# Patient Record
Sex: FEMALE | Race: WHITE | NOT HISPANIC OR LATINO | Employment: FULL TIME | URBAN - METROPOLITAN AREA
[De-identification: names, ages, dates, MRNs, and addresses within clinical notes are randomized per-mention and may not be internally consistent; named-entity substitution may affect disease eponyms.]

---

## 2018-08-24 LAB — POC BETA-HCG (QUAL): NEGATIVE

## 2020-01-15 ENCOUNTER — HISTORICAL (OUTPATIENT)
Dept: ADMINISTRATIVE | Facility: HOSPITAL | Age: 30
End: 2020-01-15

## 2020-01-18 LAB — FINAL CULTURE: NORMAL

## 2020-03-13 ENCOUNTER — HISTORICAL (OUTPATIENT)
Dept: ADMINISTRATIVE | Facility: HOSPITAL | Age: 30
End: 2020-03-13

## 2020-03-16 LAB — FINAL CULTURE: NORMAL

## 2020-08-06 ENCOUNTER — HISTORICAL (OUTPATIENT)
Dept: ADMINISTRATIVE | Facility: HOSPITAL | Age: 30
End: 2020-08-06

## 2020-08-08 LAB — FINAL CULTURE: NORMAL

## 2020-08-13 ENCOUNTER — HISTORICAL (OUTPATIENT)
Dept: RADIOLOGY | Facility: HOSPITAL | Age: 30
End: 2020-08-13

## 2022-04-10 ENCOUNTER — HISTORICAL (OUTPATIENT)
Dept: ADMINISTRATIVE | Facility: HOSPITAL | Age: 32
End: 2022-04-10

## 2022-04-24 VITALS
OXYGEN SATURATION: 100 % | HEIGHT: 60 IN | BODY MASS INDEX: 21.2 KG/M2 | SYSTOLIC BLOOD PRESSURE: 120 MMHG | DIASTOLIC BLOOD PRESSURE: 83 MMHG | WEIGHT: 108 LBS

## 2022-05-03 NOTE — HISTORICAL [facility]
This is a historical note converted from Ana. Formatting and pictures may have been removed.  Please reference [employer] for original formatting and attached multimedia. Chief Complaint  cough wheezing and SOB at times, chest congestion x 2-3 weeks, (2 negative covid tests last one around 6/23) No exposure to covid known  History of Present Illness  Pt?30 Years?White?Female?seen and evaluated in a limited status for concern for COVID-19. In order to decrease the risk of exposure to the hospital and health care workers, only a limited exam was done.  Today pt presents to clinic with cough, wheezing, SOB at times, chest congestion for 2-3 weeks.? She has had 2 negative COVID tests and last one was around 6/23. She has had no known COVID exposure. Denies hx of asthma. non-smoker. Seen in ED 7/5 and dx with acute pharyngitis. Neb tx TID. Denies HA, nausea or vomiting, denies body aches.? Cough is dry and raspy. Tx in March with same symptoms given Doxy and steroid injection.? Was on inhaler and uses Claritin daily  ?   Risk Factors Include  none  ?   Provider Precautions  N95 Mask,  Standard Isolation Gown,  Gloves,  Eye protection- Goggles  ?  Covid Screening?  No confirmed close contact  No travel to high risk area  No Recent Testing Done  Review of Systems  General: negative except as stated in hpi  Skin: negative except as stated in hpi  HEENT: negative except as stated in hpi  Respiratory: negative except as stated in hpi  CV: negative except as stated in hpi  GI: negative except as stated in hpi  : negative except as stated in hpi  MS: negative except as stated in hpi  Neuro: negative except as stated in hpi  Hematologic: negative except as stated in hpi  Endocrine: negative except as stated in hpi  Psych: negative except as stated in hpi  Physical Exam  Vitals & Measurements  T:?37.2? ?C (Oral)? HR:?91(Peripheral)? RR:?20? BP:?109/76? SpO2:?99%?  HT:?152.00?cm? HT:?152?cm? WT:?50.000?kg? WT:?50?kg? BMI:?21.64?  LMP:?07/06/2020 00:00 CDT?  General: Well-developed, well-nourished, conscious and coherent, in NAD  VS: reviewed  Skin: w/d without rash, good texture and turgor  HEENT:  ???? Head: NC/AT  ???? Eyes: Sclera and conjunctiva normal, PERRLA, EOMI  ???? Ears: canals patent without erythema/edema; TMs pearly gray without erythema/edema/drainage/bulging/retraction  ???? Nose/Face: Without rhinorrhea; turbinates boggy with erythema/edema  ???? Mouth/Throat: MMM, posterior pharynx clear without erythema or exudates  Neck: supple without meningismus or adenopathy. Carotids are equal. Trachea midline. No bruits or JVD.  Chest: normal AP diameter. Good expansion without retractions. Nontender. Lungs CTAB  Heart: RRR. Tones normal. No m/r/g. No peripheral edema. Pulses 2+  Abdomen: soft, nontender without masses, guarding, or rebound.? BS active.? No hepatosplenomegaly.  Back: without spinal or CVAT  Extremities: FROM. Good strength, bilaterally. No clubbing, cyanosis. or edema.? Peripheral pulses intact and sensation intact.  Neuro:? AAOx4. GCS 15. Cranial nerves II-XII intact. Motor and sensory exam nonfocal. REID. Speech clear. Normal gait  Assessment/Plan  1.?Frontal sinusitis?J32.1  Stop taking?Mucinex  Augmentin 875/125mg po BID x 10 days  Medrol dose pack  Flonase nasal spray daily  Continue claritin daily and inhalers  FU with PCP if no improvement in 2-3 days.  Ordered:  amoxicillin-clavulanate, = 1 tab(s), Oral, q12hr, X 10 day(s), # 20 tab(s), 0 Refill(s), Pharmacy: [facility] #[id number], 152, cm, Height/Length Dosing, 08/06/20 12:30:00 CDT, 50, kg, Weight Dosing, 08/06/20 12:30:00 CDT  fluticasone nasal, 1 spray(s), Nasal, BID, # 1 bottle(s), 0 Refill(s), Pharmacy: [facility] #[id number], 152, cm, Height/Length Dosing, 08/06/20 12:30:00 CDT, 50, kg, Weight Dosing, 08/06/20 12:30:00 CDT  methylPREDNISolone, = 1 packet(s), Oral, As Directed, as directed on package labeling, X 6 day(s), # 21 tab(s), 0  Refill(s), Pharmacy: [facility] #[id number], 152, cm, Height/Length Dosing, 08/06/20 12:30:00 CDT, 50, kg, Weight Dosing, 08/06/20 12:30:00 CDT  Office/Outpatient Visit Level 4 Established [id number] PC, Frontal sinusitis  Sore throat, 08/06/20 13:45:00 CDT  ?  2.?Sore throat?J02.9,?Sore throat?J02.9  Strep negative, culture pending  warm salt water gargles  Ordered:  Office/Outpatient Visit Level 4 Established [id number] PC, Frontal sinusitis  Sore throat, 08/06/20 13:45:00 CDT  ?   Problem List/Past Medical History  Ongoing  Anxiety  GERD (gastroesophageal reflux disease)  Ovarian cyst  Tobacco user  Historical  No qualifying data  Procedure/Surgical History  tubes placed in ears during infancy   Medications  albuterol CFC free 90 mcg/inh inhalation aerosol with adapter, 1 puff(s), INH, QID, PRN  atropine-diphenoxylate 0.025 mg-2.5 mg oral tablet, 2 tab(s), Oral, QID,? ?Not Taking, Completed Rx  Augmentin 875 mg-125 mg oral tablet, 1 tab(s), Oral, q12hr  Claritin Reditab 10 mg oral tablet, disintegrating, 10 mg= 1 tab(s), Oral, Daily  Flonase 50 mcg/inh nasal spray, 1 spray(s), Nasal, BID  hyoscyamine 0.125 mg sublingual tablet, 0.125 mg= 1 tab(s), SL, q4hr,? ?Not Taking per Prescriber  Medrol Dosepak 4 mg oral tablet, 1 packet(s), Oral, As Directed  Vistaril 25 mg oral capsule, 25 mg= 1 cap(s), Oral, QID, PRN  Allergies  Pediazole  Social History  Abuse/Neglect  No, 08/06/2020  Alcohol - Low Risk, 12/02/2014  Current, 1-2 times per week, 03/13/2020  Employment/School  Employed, Work/School description: ., 08/24/2018  Exercise  Exercise frequency: 3-4 times/week. Exercise type: bike., 08/24/2018  Home/Environment  Lives with roommate. Living situation: Home/Independent., 08/24/2018  Nutrition/Health  Regular, 08/24/2018  Sexual  Sexually active: Yes. Number of current partners 1. Sexual orientation: Bisexual., 08/24/2018  Substance Use - Denies Substance Abuse, 08/05/2014  Current, Marijuana, 1-2 times per  year, 07/05/2020  Tobacco - Denies Tobacco Use, 09/24/2014  Never (less than 100 in lifetime), No, 08/06/2020  Family History  Asthma.: Mother.  Diabetes mellitus type 2: Grandmother.  Skin cancer: Grandmother.  Immunizations  Vaccine Date Status   influenza virus vaccine, inactivated 01/14/2014 Recorded   influenza virus vaccine, inactivated 12/14/2012 Recorded   Health Maintenance  Health Maintenance  ???Pending?(in the next year)  ??? ??Due?  ??? ? ? ?Influenza Vaccine due??08/06/20??and every?  ??? ? ? ?Tetanus Vaccine due??08/06/20??and every 10??year(s)  ??? ??Refused?  ??? ? ? ?Smoking Cessation due??01/01/20??and every 1??year(s)  ??? ??Due In Future?  ??? ? ? ?Obesity Screening not due until??01/01/21??and every 1??year(s)  ??? ? ? ?Alcohol Misuse Screening not due until??01/02/21??and every 1??year(s)  ??? ? ? ?Depression Screening not due until??03/13/21??and every 1??year(s)  ??? ? ? ?ADL Screening not due until??03/13/21??and every 1??year(s)  ???Satisfied?(in the past 1 year)  ??? ??Satisfied?  ??? ? ? ?ADL Screening on??03/13/20.??Satisfied by Paul Cueto  ??? ? ? ?Alcohol Misuse Screening on??03/13/20.??Satisfied by Paul Cueto  ??? ? ? ?Blood Pressure Screening on??08/06/20.??Satisfied by Gifty Parker LPN  ??? ? ? ?Body Mass Index Check on??08/06/20.??Satisfied by Gifty Parker LPN  ??? ? ? ?Depression Screening on??03/13/20.??Satisfied by Paul Cueto  ??? ? ? ?Influenza Vaccine on??01/15/20.??Satisfied by Gifty Parker LPN  ??? ? ? ?Obesity Screening on??08/06/20.??Satisfied by Gifty Parker LPN  ??? ??Refused?  ??? ? ? ?Smoking Cessation on??03/13/20.??Recorded by Paul Cueto??Reason: Patient Refuses  ?  Lab Results  Test Name Test Result Date/Time   Rapid Strep POC Negative 08/06/2020 12:42 CDT   Diagnostic Results  (08/06/2020 13:22 CDT XR Chest 2 Views)  * Final Report *  ?  Reason For Exam  cough x 3 weeks;Cough  ?  Radiology Report  ?  History:  Cough  ?  Reference:  11 May  2016  ?  Findings:  PA and lateral views of the chest were obtained. Heart and mediastinum  within normal limits. The lungs are clear. No pneumothorax or  significant effusion.  ?  Impression:?  No acute cardiopulmonary abnormality. [1]     [1]?XR Chest 2 Views; Nimisha GARCIA, Zhen Brandon 08/06/2020 13:22 CDT

## 2022-05-19 ENCOUNTER — OFFICE VISIT (OUTPATIENT)
Dept: URGENT CARE | Facility: CLINIC | Age: 32
End: 2022-05-19
Payer: MEDICAID

## 2022-05-19 VITALS
HEIGHT: 61 IN | HEART RATE: 65 BPM | SYSTOLIC BLOOD PRESSURE: 101 MMHG | RESPIRATION RATE: 20 BRPM | TEMPERATURE: 99 F | WEIGHT: 126.13 LBS | OXYGEN SATURATION: 97 % | BODY MASS INDEX: 23.81 KG/M2 | DIASTOLIC BLOOD PRESSURE: 67 MMHG

## 2022-05-19 DIAGNOSIS — Z87.09 HISTORY OF ASTHMA: Primary | ICD-10-CM

## 2022-05-19 PROCEDURE — 3078F DIAST BP <80 MM HG: CPT | Mod: CPTII,,, | Performed by: NURSE PRACTITIONER

## 2022-05-19 PROCEDURE — 3008F BODY MASS INDEX DOCD: CPT | Mod: CPTII,,, | Performed by: NURSE PRACTITIONER

## 2022-05-19 PROCEDURE — 1160F PR REVIEW ALL MEDS BY PRESCRIBER/CLIN PHARMACIST DOCUMENTED: ICD-10-PCS | Mod: CPTII,,, | Performed by: NURSE PRACTITIONER

## 2022-05-19 PROCEDURE — 3074F SYST BP LT 130 MM HG: CPT | Mod: CPTII,,, | Performed by: NURSE PRACTITIONER

## 2022-05-19 PROCEDURE — 3078F PR MOST RECENT DIASTOLIC BLOOD PRESSURE < 80 MM HG: ICD-10-PCS | Mod: CPTII,,, | Performed by: NURSE PRACTITIONER

## 2022-05-19 PROCEDURE — 3074F PR MOST RECENT SYSTOLIC BLOOD PRESSURE < 130 MM HG: ICD-10-PCS | Mod: CPTII,,, | Performed by: NURSE PRACTITIONER

## 2022-05-19 PROCEDURE — 1160F RVW MEDS BY RX/DR IN RCRD: CPT | Mod: CPTII,,, | Performed by: NURSE PRACTITIONER

## 2022-05-19 PROCEDURE — 99214 OFFICE O/P EST MOD 30 MIN: CPT | Mod: PBBFAC | Performed by: NURSE PRACTITIONER

## 2022-05-19 PROCEDURE — 1159F PR MEDICATION LIST DOCUMENTED IN MEDICAL RECORD: ICD-10-PCS | Mod: CPTII,,, | Performed by: NURSE PRACTITIONER

## 2022-05-19 PROCEDURE — 3008F PR BODY MASS INDEX (BMI) DOCUMENTED: ICD-10-PCS | Mod: CPTII,,, | Performed by: NURSE PRACTITIONER

## 2022-05-19 PROCEDURE — 99203 OFFICE O/P NEW LOW 30 MIN: CPT | Mod: S$PBB,,, | Performed by: NURSE PRACTITIONER

## 2022-05-19 PROCEDURE — 99203 PR OFFICE/OUTPT VISIT, NEW, LEVL III, 30-44 MIN: ICD-10-PCS | Mod: S$PBB,,, | Performed by: NURSE PRACTITIONER

## 2022-05-19 PROCEDURE — 1159F MED LIST DOCD IN RCRD: CPT | Mod: CPTII,,, | Performed by: NURSE PRACTITIONER

## 2022-05-19 RX ORDER — PAROXETINE 10 MG/1
10 TABLET, FILM COATED ORAL DAILY
COMMUNITY
Start: 2022-05-14 | End: 2023-11-15 | Stop reason: ALTCHOICE

## 2022-05-19 RX ORDER — CETIRIZINE HYDROCHLORIDE 10 MG/1
10 TABLET ORAL NIGHTLY
Qty: 30 TABLET | Refills: 0 | COMMUNITY
Start: 2022-05-19 | End: 2022-06-18

## 2022-05-19 RX ORDER — ALBUTEROL SULFATE 90 UG/1
2 AEROSOL, METERED RESPIRATORY (INHALATION) EVERY 6 HOURS PRN
Qty: 18 G | Refills: 0 | Status: SHIPPED | OUTPATIENT
Start: 2022-05-19 | End: 2023-12-26 | Stop reason: ALTCHOICE

## 2022-05-20 ENCOUNTER — OFFICE VISIT (OUTPATIENT)
Dept: URGENT CARE | Facility: CLINIC | Age: 32
End: 2022-05-20
Payer: MEDICAID

## 2022-05-20 ENCOUNTER — HOSPITAL ENCOUNTER (OUTPATIENT)
Dept: RADIOLOGY | Facility: HOSPITAL | Age: 32
Discharge: HOME | End: 2022-05-20
Attending: FAMILY MEDICINE
Payer: MEDICAID

## 2022-05-20 VITALS
DIASTOLIC BLOOD PRESSURE: 67 MMHG | BODY MASS INDEX: 23.93 KG/M2 | OXYGEN SATURATION: 99 % | HEART RATE: 58 BPM | TEMPERATURE: 98 F | SYSTOLIC BLOOD PRESSURE: 112 MMHG | WEIGHT: 126.75 LBS | HEIGHT: 61 IN | RESPIRATION RATE: 16 BRPM

## 2022-05-20 DIAGNOSIS — T14.90XA TRAUMA: Primary | ICD-10-CM

## 2022-05-20 DIAGNOSIS — S93.401A SPRAIN OF RIGHT ANKLE, UNSPECIFIED LIGAMENT, INITIAL ENCOUNTER: ICD-10-CM

## 2022-05-20 PROCEDURE — 3074F PR MOST RECENT SYSTOLIC BLOOD PRESSURE < 130 MM HG: ICD-10-PCS | Mod: CPTII,,, | Performed by: FAMILY MEDICINE

## 2022-05-20 PROCEDURE — 3078F DIAST BP <80 MM HG: CPT | Mod: CPTII,,, | Performed by: FAMILY MEDICINE

## 2022-05-20 PROCEDURE — 99214 OFFICE O/P EST MOD 30 MIN: CPT | Mod: S$PBB,,, | Performed by: FAMILY MEDICINE

## 2022-05-20 PROCEDURE — 73610 X-RAY EXAM OF ANKLE: CPT | Mod: TC,RT

## 2022-05-20 PROCEDURE — 3008F PR BODY MASS INDEX (BMI) DOCUMENTED: ICD-10-PCS | Mod: CPTII,,, | Performed by: FAMILY MEDICINE

## 2022-05-20 PROCEDURE — 3074F SYST BP LT 130 MM HG: CPT | Mod: CPTII,,, | Performed by: FAMILY MEDICINE

## 2022-05-20 PROCEDURE — 99214 PR OFFICE/OUTPT VISIT, EST, LEVL IV, 30-39 MIN: ICD-10-PCS | Mod: S$PBB,,, | Performed by: FAMILY MEDICINE

## 2022-05-20 PROCEDURE — 1160F PR REVIEW ALL MEDS BY PRESCRIBER/CLIN PHARMACIST DOCUMENTED: ICD-10-PCS | Mod: CPTII,,, | Performed by: FAMILY MEDICINE

## 2022-05-20 PROCEDURE — 1159F MED LIST DOCD IN RCRD: CPT | Mod: CPTII,,, | Performed by: FAMILY MEDICINE

## 2022-05-20 PROCEDURE — 3008F BODY MASS INDEX DOCD: CPT | Mod: CPTII,,, | Performed by: FAMILY MEDICINE

## 2022-05-20 PROCEDURE — 99215 OFFICE O/P EST HI 40 MIN: CPT | Mod: PBBFAC | Performed by: FAMILY MEDICINE

## 2022-05-20 PROCEDURE — 73610 XR ANKLE COMPLETE 3 VIEW RIGHT: ICD-10-PCS | Mod: 26,RT,, | Performed by: RADIOLOGY

## 2022-05-20 PROCEDURE — 1160F RVW MEDS BY RX/DR IN RCRD: CPT | Mod: CPTII,,, | Performed by: FAMILY MEDICINE

## 2022-05-20 PROCEDURE — 1159F PR MEDICATION LIST DOCUMENTED IN MEDICAL RECORD: ICD-10-PCS | Mod: CPTII,,, | Performed by: FAMILY MEDICINE

## 2022-05-20 PROCEDURE — 3078F PR MOST RECENT DIASTOLIC BLOOD PRESSURE < 80 MM HG: ICD-10-PCS | Mod: CPTII,,, | Performed by: FAMILY MEDICINE

## 2022-05-20 PROCEDURE — 73610 X-RAY EXAM OF ANKLE: CPT | Mod: 26,RT,, | Performed by: RADIOLOGY

## 2022-05-20 RX ORDER — LORATADINE 10 MG/1
TABLET ORAL
COMMUNITY

## 2022-05-20 RX ORDER — FLUTICASONE PROPIONATE 50 MCG
SPRAY, SUSPENSION (ML) NASAL
COMMUNITY

## 2022-05-20 NOTE — PROGRESS NOTES
"Subjective:       Patient ID: Aliya Alcaraz is a 32 y.o. female.    Vitals:  height is 5' 1.02" (1.55 m) and weight is 57.5 kg (126 lb 12.2 oz). Her temperature is 98.4 °F (36.9 °C). Her blood pressure is 112/67 and her pulse is 58 (abnormal). Her respiration is 16 and oxygen saturation is 99%.     Chief Complaint: Rt ankle pain (Pt states she "rolled ankle" on an incline yesterday.)    HPI   32-year-old female presents to urgent care after an inversion injury to the right ankle yesterday.  Having lateral ankle pain, no foot pain, no leg or knee pain.  No other injury  ROS    Constitutional: negative except as stated in HPI  Eye: negative except as stated in HPI  ENT: negative except as stated in HPI  Respiratory: negative except as stated in HPI  Cardiovascular: negative except as stated in HPI  Gastrointestinal: negative except as stated in HPI  Genitourinary: negative except as stated in HPI  Objective:      Physical Exam    VITAL SIGNS:  Reviewed.      GENERAL:  In no apparent distress  HEAD:  No signs of head trauma    MUSCULOSKELETAL:  Right Foot  Inspection:  Lateral ankle effusion, no erythema or heat.  Palpation:  Mild tenderness over lateral malleolus, o/w negative  ROM: full  Strength: 5/5 in all planes  Special Tests:  Anterior Drawer: negative  Talar Tilt: negative  Interdigital Neuroma Test: negative  NEUROLOGIC EXAM:  Alert and oriented x 3.  No focal sensory or strength deficits.   Speech normal.  Follows commands    XR ANKLE COMPLETE 3 VIEW RIGHT    Result Date: 5/20/2022  EXAMINATION: XR ANKLE COMPLETE 3 VIEW RIGHT CLINICAL HISTORY: Injury, unspecified, initial encounter TECHNIQUE: Three views COMPARISON: None available FINDINGS: There are soft tissue inflammations overlying the lateral malleolus.  Articular surfaces alignment is preserved.  No acute fracture or dislocation identified.     No acute fracture identified. Electronically signed by: Ti Garcia Date:    05/20/2022 " Time:    15:29    Assessment:       1. Trauma    2. Sprain of right ankle, unspecified ligament, initial encounter          Plan:         Trauma  -     XR ANKLE COMPLETE 3 VIEW RIGHT; Future; Expected date: 05/20/2022    Sprain of right ankle, unspecified ligament, initial encounter                 Discussed negative x-ray findings.  Given gel splint, discussed range-of-motion exercises.  Rest, ice, compression, and elevation.

## 2022-05-20 NOTE — PROGRESS NOTES
"Subjective:      Patient ID: Aliya Alcaraz is a 32 y.o. female.    Chief Complaint: Chest Pain (Tightness after covid 2 weeks ago. Patient states she has asthma, when she get cold her asthma bothers her more. ), Nasal Congestion (X 2 weeks), and Cough     32-year-old female presents to the clinic reporting of wheezing and a cough specially at night. Patient state 2 weeks ago she tested positive for COVID-19 and she was seen at [facility] and she was prescribed Z-Marquez, Medrol Dosepak, and a cough syrup with codeine. Patient state the clinic had told her not to take any of these medication since she tested positive for COVID-19. Patient state to home COVID testing in the past few days that were negative. Patient denies any fever or nausea or vomiting or diarrhea. Patient denies chest pain or short of breath or any other symptoms. Patient wants to make sure her lungs were okay. Patient requesting refill on her albuterol inhaler. History of asthma.    Review of Systems   Respiratory: Positive for cough and wheezing.    All other systems reviewed and are negative.      /67   Pulse 65   Temp 98.6 °F (37 °C)   Resp 20   Ht 5' 1" (1.549 m)   Wt 57.2 kg (126 lb 1.6 oz)   LMP 04/25/2022 (LMP Unknown)   SpO2 97%   BMI 23.83 kg/m²      Current Outpatient Medications:     albuterol (PROVENTIL/VENTOLIN HFA) 90 mcg/actuation inhaler, Inhale 2 puffs into the lungs every 6 (six) hours as needed for Wheezing or Shortness of Breath (Cough)., Disp: 18 g, Rfl: 0    cetirizine (ZYRTEC) 10 MG tablet, Take 1 tablet (10 mg total) by mouth nightly., Disp: 30 tablet, Rfl: 0    paroxetine (PAXIL) 10 MG tablet, Take 10 mg by mouth once daily., Disp: , Rfl:     Objective:     Physical Exam  Vitals and nursing note reviewed.   Constitutional:       Appearance: Normal appearance.   HENT:      Head: Normocephalic and atraumatic.      Right Ear: Tympanic membrane, ear canal and external ear normal.      Left Ear: Tympanic " membrane, ear canal and external ear normal.      Nose: Nose normal.      Mouth/Throat:      Mouth: Mucous membranes are moist.      Pharynx: Oropharynx is clear.      Comments: Clear postnasal drip, uvula midline  Eyes:      Extraocular Movements: Extraocular movements intact.      Conjunctiva/sclera: Conjunctivae normal.      Pupils: Pupils are equal, round, and reactive to light.   Cardiovascular:      Rate and Rhythm: Normal rate and regular rhythm.      Pulses: Normal pulses.      Heart sounds: Normal heart sounds. No murmur heard.  Pulmonary:      Effort: Pulmonary effort is normal.      Breath sounds: Normal breath sounds. No wheezing, rhonchi or rales.   Chest:      Chest wall: No tenderness.   Musculoskeletal:         General: Normal range of motion.      Cervical back: Normal range of motion and neck supple. No tenderness.   Lymphadenopathy:      Cervical: No cervical adenopathy.   Skin:     General: Skin is warm.      Capillary Refill: Capillary refill takes less than 2 seconds.      Findings: No rash.   Neurological:      General: No focal deficit present.      Mental Status: She is alert and oriented to person, place, and time. Mental status is at baseline.   Psychiatric:         Mood and Affect: Mood normal.         Behavior: Behavior normal.         Thought Content: Thought content normal.         Judgment: Judgment normal.       Assessment:     Problem List Items Addressed This Visit    None     Visit Diagnoses     History of asthma    -  Primary    Relevant Medications    cetirizine (ZYRTEC) 10 MG tablet    albuterol (PROVENTIL/VENTOLIN HFA) 90 mcg/actuation inhaler          Plan:   Discussed physical exam findings with patient  Discussed medication  prescribed with patient, Rx albuterol inhaler, cetirizine 10 mg, stay hydrated with fluids specially water, continue social distancing, mask wearing, good hygiene follow CDC  recommendation regarding COVID-19 and flu  Instructed patient to notify his/  her primary care provider regarding the visit today and schedule a follow-up appointment in 2-3 days if needed   instructed patient to come back to the clinic or go to nearest emergency room department if symptoms worsens or no improvement or for any other reason   questions elicited and answered  Patient verbalized understanding of discharge instructions, verbalizes understanding of medication prescribed any issues, verbalizes understanding to read discharge instructions    History of asthma  -     cetirizine (ZYRTEC) 10 MG tablet; Take 1 tablet (10 mg total) by mouth nightly.  Dispense: 30 tablet; Refill: 0  -     albuterol (PROVENTIL/VENTOLIN HFA) 90 mcg/actuation inhaler; Inhale 2 puffs into the lungs every 6 (six) hours as needed for Wheezing or Shortness of Breath (Cough).  Dispense: 18 g; Refill: 0         Recent Lab Results     None                This note was created with the assistance of a voice recognition software or  phone dictation. There may be transcription errors as a result of using this technology, however minimal effort has been made to assure accuracy of transcription, but any obvious errors or omissions should be clarified with the author of the document.

## 2022-07-26 ENCOUNTER — OFFICE VISIT (OUTPATIENT)
Dept: URGENT CARE | Facility: CLINIC | Age: 32
End: 2022-07-26
Payer: MEDICAID

## 2022-07-26 ENCOUNTER — HOSPITAL ENCOUNTER (EMERGENCY)
Facility: HOSPITAL | Age: 32
Discharge: HOME | End: 2022-07-27
Attending: STUDENT IN AN ORGANIZED HEALTH CARE EDUCATION/TRAINING PROGRAM
Payer: MEDICAID

## 2022-07-26 VITALS
SYSTOLIC BLOOD PRESSURE: 114 MMHG | HEART RATE: 105 BPM | RESPIRATION RATE: 18 BRPM | DIASTOLIC BLOOD PRESSURE: 71 MMHG | BODY MASS INDEX: 23.6 KG/M2 | TEMPERATURE: 99 F | HEIGHT: 61 IN | OXYGEN SATURATION: 100 % | WEIGHT: 125 LBS

## 2022-07-26 DIAGNOSIS — R11.0 NAUSEA: ICD-10-CM

## 2022-07-26 DIAGNOSIS — R82.90 ABNORMAL URINE: ICD-10-CM

## 2022-07-26 DIAGNOSIS — R10.9 ABDOMINAL PAIN, UNSPECIFIED ABDOMINAL LOCATION: Primary | ICD-10-CM

## 2022-07-26 DIAGNOSIS — K52.9 GASTROENTERITIS: Primary | ICD-10-CM

## 2022-07-26 DIAGNOSIS — R19.7 DIARRHEA, UNSPECIFIED TYPE: ICD-10-CM

## 2022-07-26 DIAGNOSIS — Z11.52 ENCOUNTER FOR SCREENING FOR SEVERE ACUTE RESPIRATORY SYNDROME CORONAVIRUS 2 (SARS-COV-2) INFECTION: ICD-10-CM

## 2022-07-26 LAB
APPEARANCE UR: CLEAR
B-HCG UR QL: NEGATIVE
BACTERIA #/AREA URNS AUTO: ABNORMAL /HPF
BILIRUB UR QL STRIP.AUTO: NEGATIVE MG/DL
BILIRUB UR QL STRIP: POSITIVE
COLOR UR AUTO: YELLOW
CTP QC/QA: YES
FLUAV AG NPH QL: NEGATIVE
FLUBV AG NPH QL: NEGATIVE
GLUCOSE UR QL STRIP.AUTO: NORMAL MG/DL
GLUCOSE UR QL STRIP: NEGATIVE
HYALINE CASTS #/AREA URNS LPF: ABNORMAL /LPF
KETONES UR QL STRIP.AUTO: ABNORMAL MG/DL
KETONES UR QL STRIP: POSITIVE
LEUKOCYTE ESTERASE UR QL STRIP.AUTO: NEGATIVE UNIT/L
LEUKOCYTE ESTERASE UR QL STRIP: NEGATIVE
MUCOUS THREADS URNS QL MICRO: ABNORMAL /LPF
NITRITE UR QL STRIP.AUTO: NEGATIVE
PH UR STRIP.AUTO: 8 [PH]
PH, POC UA: 8
POC BLOOD, URINE: NEGATIVE
POC NITRATES, URINE: NEGATIVE
PROT UR QL STRIP.AUTO: ABNORMAL MG/DL
PROT UR QL STRIP: POSITIVE
RBC #/AREA URNS AUTO: ABNORMAL /HPF
RBC UR QL AUTO: NEGATIVE UNIT/L
SARS-COV-2 RDRP RESP QL NAA+PROBE: NEGATIVE
SP GR UR STRIP.AUTO: 1.04
SP GR UR STRIP: 1.02 (ref 1–1.03)
SQUAMOUS #/AREA URNS LPF: ABNORMAL /HPF
UROBILINOGEN UR STRIP-ACNC: 1 (ref 0.1–1.1)
UROBILINOGEN UR STRIP-ACNC: ABNORMAL MG/DL
WBC #/AREA URNS AUTO: ABNORMAL /HPF

## 2022-07-26 PROCEDURE — 87804 INFLUENZA ASSAY W/OPTIC: CPT | Mod: PBBFAC

## 2022-07-26 PROCEDURE — 99214 OFFICE O/P EST MOD 30 MIN: CPT | Mod: PBBFAC

## 2022-07-26 PROCEDURE — 81025 URINE PREGNANCY TEST: CPT | Mod: PBBFAC

## 2022-07-26 PROCEDURE — 83690 ASSAY OF LIPASE: CPT | Performed by: PHYSICIAN ASSISTANT

## 2022-07-26 PROCEDURE — 81003 URINALYSIS AUTO W/O SCOPE: CPT | Mod: PBBFAC

## 2022-07-26 PROCEDURE — 99213 OFFICE O/P EST LOW 20 MIN: CPT | Mod: S$PBB,,,

## 2022-07-26 PROCEDURE — U0002 COVID-19 LAB TEST NON-CDC: HCPCS | Mod: PBBFAC

## 2022-07-26 PROCEDURE — 81025 URINE PREGNANCY TEST: CPT | Performed by: PHYSICIAN ASSISTANT

## 2022-07-26 PROCEDURE — 80053 COMPREHEN METABOLIC PANEL: CPT | Performed by: PHYSICIAN ASSISTANT

## 2022-07-26 PROCEDURE — 36415 COLL VENOUS BLD VENIPUNCTURE: CPT | Performed by: PHYSICIAN ASSISTANT

## 2022-07-26 PROCEDURE — 99213 PR OFFICE/OUTPT VISIT, EST, LEVL III, 20-29 MIN: ICD-10-PCS | Mod: S$PBB,,,

## 2022-07-26 PROCEDURE — 85025 COMPLETE CBC W/AUTO DIFF WBC: CPT | Performed by: PHYSICIAN ASSISTANT

## 2022-07-26 PROCEDURE — 99284 EMERGENCY DEPT VISIT MOD MDM: CPT | Mod: 25,27

## 2022-07-26 PROCEDURE — 81001 URINALYSIS AUTO W/SCOPE: CPT

## 2022-07-26 RX ORDER — ONDANSETRON 4 MG/1
4 TABLET, ORALLY DISINTEGRATING ORAL EVERY 8 HOURS PRN
Qty: 10 TABLET | Refills: 0 | Status: SHIPPED | OUTPATIENT
Start: 2022-07-26 | End: 2023-12-26 | Stop reason: ALTCHOICE

## 2022-07-26 NOTE — LETTER
July 26, 2022      [facility]  [address] [address]  Phone: [phone]       Patient: Aliya Alcaraz   YOB: 1990  Date of Visit: 07/26/2022    To Whom It May Concern:    Mikey Alcaraz  was at [facility] on 07/26/2022. The patient may return to work/school on 7/30/2022 with no restrictions. If you have any questions or concerns, or if I can be of further assistance, please do not hesitate to contact me.    Sincerely,    MARY CARMEN HANDY NP

## 2022-07-26 NOTE — PROGRESS NOTES
"Subjective:       Patient ID: Aliya Alcaraz is a 32 y.o. female.    Vitals:  height is 5' 1" (1.549 m) and weight is 56.7 kg (125 lb). Her oral temperature is 98.9 °F (37.2 °C). Her blood pressure is 114/71 and her pulse is 105. Her respiration is 18 and oxygen saturation is 100%.     Chief Complaint: Abdominal Pain, Nausea, Diarrhea (Abdominal pain noted for about one week ago, nausea and vomiting today. ), and Weakness    32 year old with abdominal pain x one week, started with N/V/D today. States has only been able to sip fluids today. Denies fever. States generalized abdominal pain. No guarding or rebound tenderness.  [place] if she ingested any unsanitary lake water. Denies dysuria or frequency.   COVID/FLU negative    Abdominal Pain  This is a new problem. The current episode started in the past 7 days. The onset quality is gradual. The pain is located in the generalized abdominal region. The pain is at a severity of 4/10. The pain is mild. The quality of the pain is aching. The abdominal pain does not radiate. Associated symptoms include diarrhea and nausea.   Nausea  Associated symptoms include abdominal pain and nausea.   Diarrhea   Associated symptoms include abdominal pain.       Constitution: Positive for generalized weakness.   HENT: Negative.    Neck: neck negative.   Cardiovascular: Negative.    Respiratory: Negative.    Gastrointestinal: Positive for abdominal pain, nausea and diarrhea.       Objective:      Physical Exam   Constitutional: normal  HENT:   Head: Normocephalic.   Nose: Nose normal.   Mouth/Throat: Mucous membranes are moist.   Eyes: Pupils are equal, round, and reactive to light.   Cardiovascular: Normal rate, regular rhythm and normal pulses.   Pulmonary/Chest: Effort normal.   Abdominal: Normal appearance and bowel sounds are normal. She exhibits no distension and no mass. Soft. There is generalized abdominal tenderness. There is no rebound, no guarding, " no left CVA tenderness and no right CVA tenderness. No hernia.   Musculoskeletal: Normal range of motion.         General: Normal range of motion.   Neurological: no focal deficit. She is alert.   Skin: Skin is warm.   Psychiatric: Mood normal.   Vitals reviewed.        Assessment:       1. Abdominal pain, unspecified abdominal location    2. Encounter for screening for severe acute respiratory syndrome coronavirus 2 (SARS-CoV-2) infection    3. Diarrhea, unspecified type    4. Abnormal urine    5. Nausea          Plan:         UA sent for urinalysis. COVID FLU negative.  Ondansetron for nausea.   See patient education for more guidance.    Prescription meds were sent for you - wait 8 hours from the time you left our clinic to take another dose of Ondansetron.     Sanitize your bathroom and all shared surfaces with BLEACH as frequently as you can while ill.     Start back with WATER only. When you can keep water down for 2 hours, you may try powerade, gatorade, pedialyte or electrolyte beverage of choice.    Do not attempt to eat until this evening, and when you do - follow BLAND diet listed above, today and tomorrow.     Please see provided patient education for guidance.    Go to the ER if you experience chest pain with SOB, SOBOE, high fevers 103+, excessive vomiting/diarrhea, or general distress.          Additional stool samples and blood work ordered after urinalysis resulted.

## 2022-07-26 NOTE — PROGRESS NOTES
Subjective:       Patient ID: Aliya Alcaraz is a 32 y.o. female.    Chief Complaint: Abdominal Pain, Nausea, Diarrhea (Abdominal pain noted for about one week ago, nausea and vomiting today. ), and Weakness    HPI  ROS     Objective:      Physical Exam    Assessment:       1. Abdominal pain, unspecified abdominal location    2. Encounter for screening for severe acute respiratory syndrome coronavirus 2 (SARS-CoV-2) infection    3. Diarrhea, unspecified type    4. Abnormal urine        Plan:                   No follow-ups on file.

## 2022-07-27 ENCOUNTER — TELEPHONE (OUTPATIENT)
Dept: URGENT CARE | Facility: CLINIC | Age: 32
End: 2022-07-27
Payer: MEDICAID

## 2022-07-27 VITALS
DIASTOLIC BLOOD PRESSURE: 64 MMHG | RESPIRATION RATE: 16 BRPM | HEART RATE: 86 BPM | TEMPERATURE: 99 F | SYSTOLIC BLOOD PRESSURE: 104 MMHG | OXYGEN SATURATION: 99 %

## 2022-07-27 DIAGNOSIS — A07.1 GIARDIA: Primary | ICD-10-CM

## 2022-07-27 LAB
ALBUMIN SERPL-MCNC: 3.9 GM/DL (ref 3.5–5)
ALBUMIN/GLOB SERPL: 1.3 RATIO (ref 1.1–2)
ALP SERPL-CCNC: 38 UNIT/L (ref 40–150)
ALT SERPL-CCNC: 18 UNIT/L (ref 0–55)
APPEARANCE UR: CLEAR
AST SERPL-CCNC: 18 UNIT/L (ref 5–34)
B-HCG UR QL: NEGATIVE
BACTERIA #/AREA URNS AUTO: ABNORMAL /HPF
BASOPHILS # BLD AUTO: 0.01 X10(3)/MCL (ref 0–0.2)
BASOPHILS NFR BLD AUTO: 0.1 %
BILIRUB UR QL STRIP.AUTO: NEGATIVE MG/DL
BILIRUBIN DIRECT+TOT PNL SERPL-MCNC: 1.6 MG/DL
BUN SERPL-MCNC: 11.7 MG/DL (ref 7–18.7)
CALCIUM SERPL-MCNC: 8.9 MG/DL (ref 8.4–10.2)
CHLORIDE SERPL-SCNC: 99 MMOL/L (ref 98–107)
CO2 SERPL-SCNC: 27 MMOL/L (ref 22–29)
COLOR UR AUTO: YELLOW
CREAT SERPL-MCNC: 0.79 MG/DL (ref 0.55–1.02)
CRYPTOSP AG SPEC QL: NEGATIVE
CTP QC/QA: YES
EOSINOPHIL # BLD AUTO: 0.02 X10(3)/MCL (ref 0–0.9)
EOSINOPHIL NFR BLD AUTO: 0.3 %
ERYTHROCYTE [DISTWIDTH] IN BLOOD BY AUTOMATED COUNT: 12.9 % (ref 11.5–17)
G LAMBLIA AG STL QL IA: NEGATIVE
GIARDIA/CRYPTO NEG CONT (OHS): NEGATIVE
GIARDIA/CRYPTO POS CONT (OHS): POSITIVE
GLOBULIN SER-MCNC: 2.9 GM/DL (ref 2.4–3.5)
GLUCOSE SERPL-MCNC: 115 MG/DL (ref 74–100)
GLUCOSE UR QL STRIP.AUTO: NORMAL MG/DL
HCT VFR BLD AUTO: 35.4 % (ref 37–47)
HGB BLD-MCNC: 11.7 GM/DL (ref 12–16)
HYALINE CASTS #/AREA URNS LPF: ABNORMAL /LPF
IMM GRANULOCYTES # BLD AUTO: 0.03 X10(3)/MCL (ref 0–0.04)
IMM GRANULOCYTES NFR BLD AUTO: 0.4 %
KETONES UR QL STRIP.AUTO: ABNORMAL MG/DL
LEUKOCYTE ESTERASE UR QL STRIP.AUTO: NEGATIVE UNIT/L
LIPASE SERPL-CCNC: 22 U/L
LYMPHOCYTES # BLD AUTO: 0.64 X10(3)/MCL (ref 0.6–4.6)
LYMPHOCYTES NFR BLD AUTO: 8.2 %
MCH RBC QN AUTO: 28.7 PG (ref 27–31)
MCHC RBC AUTO-ENTMCNC: 33.1 MG/DL (ref 33–36)
MCV RBC AUTO: 87 FL (ref 80–94)
MONOCYTES # BLD AUTO: 0.53 X10(3)/MCL (ref 0.1–1.3)
MONOCYTES NFR BLD AUTO: 6.8 %
MUCOUS THREADS URNS QL MICRO: ABNORMAL /LPF
NEUTROPHILS # BLD AUTO: 6.6 X10(3)/MCL (ref 2.1–9.2)
NEUTROPHILS NFR BLD AUTO: 84.2 %
NITRITE UR QL STRIP.AUTO: NEGATIVE
NRBC BLD AUTO-RTO: 0 %
PH UR STRIP.AUTO: 8 [PH]
PLATELET # BLD AUTO: 186 X10(3)/MCL (ref 130–400)
PMV BLD AUTO: 9.9 FL (ref 7.4–10.4)
POTASSIUM SERPL-SCNC: 3.4 MMOL/L (ref 3.5–5.1)
PROT SERPL-MCNC: 6.8 GM/DL (ref 6.4–8.3)
PROT UR QL STRIP.AUTO: ABNORMAL MG/DL
RBC # BLD AUTO: 4.07 X10(6)/MCL (ref 4.2–5.4)
RBC #/AREA URNS AUTO: ABNORMAL /HPF
RBC UR QL AUTO: NEGATIVE UNIT/L
SODIUM SERPL-SCNC: 135 MMOL/L (ref 136–145)
SP GR UR STRIP.AUTO: 1.04
SQUAMOUS #/AREA URNS LPF: ABNORMAL /HPF
UROBILINOGEN UR STRIP-ACNC: ABNORMAL MG/DL
WBC # SPEC AUTO: 7.8 X10(3)/MCL (ref 4.5–11.5)
WBC #/AREA URNS AUTO: ABNORMAL /HPF

## 2022-07-27 PROCEDURE — 96372 THER/PROPH/DIAG INJ SC/IM: CPT | Performed by: PHYSICIAN ASSISTANT

## 2022-07-27 PROCEDURE — 63600175 PHARM REV CODE 636 W HCPCS: Performed by: PHYSICIAN ASSISTANT

## 2022-07-27 PROCEDURE — 81001 URINALYSIS AUTO W/SCOPE: CPT | Performed by: PHYSICIAN ASSISTANT

## 2022-07-27 RX ORDER — DICYCLOMINE HYDROCHLORIDE 10 MG/1
10 CAPSULE ORAL 4 TIMES DAILY PRN
Qty: 20 CAPSULE | Refills: 0 | Status: SHIPPED | OUTPATIENT
Start: 2022-07-27 | End: 2022-08-01

## 2022-07-27 RX ORDER — CIPROFLOXACIN 500 MG/1
500 TABLET ORAL EVERY 12 HOURS
Qty: 6 TABLET | Refills: 0 | Status: SHIPPED | OUTPATIENT
Start: 2022-07-27 | End: 2022-07-30

## 2022-07-27 RX ORDER — DICYCLOMINE HYDROCHLORIDE 10 MG/ML
20 INJECTION INTRAMUSCULAR
Status: COMPLETED | OUTPATIENT
Start: 2022-07-27 | End: 2022-07-27

## 2022-07-27 RX ORDER — METRONIDAZOLE 250 MG/1
250 TABLET ORAL EVERY 8 HOURS
Qty: 21 TABLET | Refills: 0 | Status: SHIPPED | OUTPATIENT
Start: 2022-07-27 | End: 2022-08-03

## 2022-07-27 RX ADMIN — SODIUM CHLORIDE, POTASSIUM CHLORIDE, SODIUM LACTATE AND CALCIUM CHLORIDE 1000 ML: 600; 310; 30; 20 INJECTION, SOLUTION INTRAVENOUS at 12:07

## 2022-07-27 RX ADMIN — DICYCLOMINE HYDROCHLORIDE 20 MG: 10 INJECTION, SOLUTION INTRAMUSCULAR at 12:07

## 2022-07-27 NOTE — ED PROVIDER NOTES
Encounter Date: 7/26/2022       History     Chief Complaint   Patient presents with    Abdominal Pain     Pt arrives with c/o Nausea, Vomiting, and diarrhea x1 day     Patient with pmhx of anxiety presents today via EMS c/o abdominal pain, nausea, vomiting, and diarrhea. Patient says she has been experiencing lower abdominal cramping for about 1 week, but all other symptoms started today. She also endorses chills. Patient was at [facility] over the weekend and admits she may have swallowed some of the water. She was seen at urgent care clinic earlier today. She was tested for covid which was negative. Patient says they ordered outpatient stool studies to be done, but she has not dropped off the sample yet. Rx for zofran given by urgent care. Denies fever, hematochezia, hematemesis, dysuria, hematuria, cp, sob, uri symptoms, weakness.     The history is provided by the patient. No  was used.     Review of patient's allergies indicates:   Allergen Reactions    Erythromycin-sulfisoxazole      Past Medical History:   Diagnosis Date    Anxiety      Past Surgical History:   Procedure Laterality Date    ADENOIDECTOMY      TONSILLECTOMY      TYMPANOSTOMY TUBE PLACEMENT       Family History   Family history unknown: Yes     Social History     Tobacco Use    Smoking status: Never Smoker    Smokeless tobacco: Never Used   Substance Use Topics    Alcohol use: Yes     Comment: monthly    Drug use: Never     Review of Systems   Constitutional: Positive for chills. Negative for fever.   HENT: Negative for congestion, postnasal drip, rhinorrhea, sinus pressure, sinus pain and sore throat.    Respiratory: Negative for cough, chest tightness and shortness of breath.    Cardiovascular: Negative for chest pain, palpitations and leg swelling.   Gastrointestinal: Positive for abdominal pain, diarrhea, nausea and vomiting. Negative for constipation and rectal pain.   Genitourinary: Negative for dysuria,  flank pain and hematuria.   Musculoskeletal: Negative for arthralgias and myalgias.   Skin: Negative for rash.   Neurological: Negative for weakness, light-headedness and headaches.       Physical Exam     Initial Vitals [07/26/22 2332]   BP Pulse Resp Temp SpO2   110/70 93 16 99.2 °F (37.3 °C) 98 %      MAP       --         Physical Exam    Vitals reviewed.  Constitutional: She appears well-developed and well-nourished. She is not diaphoretic. No distress.   HENT:   Head: Normocephalic and atraumatic.   Eyes: Conjunctivae and EOM are normal.   Neck: Neck supple.   Normal range of motion.  Cardiovascular: Normal rate, regular rhythm, normal heart sounds and intact distal pulses.   Pulmonary/Chest: Breath sounds normal. No respiratory distress.   Abdominal: Abdomen is soft and flat. Bowel sounds are normal. She exhibits no distension. There is abdominal tenderness in the right lower quadrant, suprapubic area and left lower quadrant.   No right CVA tenderness.  No left CVA tenderness. There is no rebound, no guarding, no tenderness at McBurney's point and negative Koehler's sign. negative Rovsing's sign  Musculoskeletal:         General: No edema.      Cervical back: Normal range of motion and neck supple.     Neurological: She is alert and oriented to person, place, and time. GCS score is 15. GCS eye subscore is 4. GCS verbal subscore is 5. GCS motor subscore is 6.   Skin: Skin is warm and dry. Capillary refill takes less than 2 seconds.   Psychiatric: She has a normal mood and affect.         ED Course   Procedures  Labs Reviewed   COMPREHENSIVE METABOLIC PANEL - Abnormal; Notable for the following components:       Result Value    Sodium Level 135 (*)     Potassium Level 3.4 (*)     Glucose Level 115 (*)     Bilirubin Total 1.6 (*)     Alkaline Phosphatase 38 (*)     All other components within normal limits   URINALYSIS, REFLEX TO URINE CULTURE - Abnormal; Notable for the following components:    Protein, UA 1+ (*)      Ketones, UA 2+ (*)     Urobilinogen, UA 1+ (*)     Bacteria, UA Trace (*)     Squamous Epithelial Cells, UA Occ (*)     Mucous, UA Trace (*)     All other components within normal limits   CBC WITH DIFFERENTIAL - Abnormal; Notable for the following components:    RBC 4.07 (*)     Hgb 11.7 (*)     Hct 35.4 (*)     All other components within normal limits   LIPASE - Normal   CBC W/ AUTO DIFFERENTIAL    Narrative:     The following orders were created for panel order CBC auto differential.  Procedure                               Abnormality         Status                     ---------                               -----------         ------                     CBC with Differential[[id number]]        Abnormal            Final result                 Please view results for these tests on the individual orders.   POCT URINE PREGNANCY          Imaging Results          X-Ray Abdomen Flat And Erect (Final result)  Result time 07/27/22 07:19:31    Final result by Zhen Bansal MD (07/27/22 07:19:31)                 Impression:      No acute radiographic findings.      Electronically signed by: Zhen Bansal  Date:    07/27/2022  Time:    07:19             Narrative:    EXAMINATION:  XR ABDOMEN FLAT AND ERECT    CLINICAL HISTORY:  Unspecified abdominal pain    COMPARISON:  17 November 2016    FINDINGS:  Flat and upright views of the abdomen demonstrate a nonspecific, nonobstructive bowel gas pattern.  No large stool burden.  No free air is seen.                    ED Interpretation by MYLES Sol (07/27/22 01:01:32, [facility], Emergency Medicine)    No pneumoperitoneum. No concerning air fluid levels.                                Medications   lactated ringers bolus 1,000 mL (0 mLs Intravenous Stopped 7/27/22 0100)   dicyclomine injection 20 mg (20 mg Intramuscular Given 7/27/22 0035)           APC / Resident Notes:   I was not physically present during the history, exam and  disposition of this patient.  I was available at all times for consultation. (teresita)                 Clinical Impression:   Final diagnoses:  [K52.9] Gastroenteritis (Primary)          ED Disposition Condition    Discharge Stable        ED Prescriptions     Medication Sig Dispense Start Date End Date Auth. Provider    ciprofloxacin HCl (CIPRO) 500 MG tablet Take 1 tablet (500 mg total) by mouth every 12 (twelve) hours. for 3 days 6 tablet 7/27/2022 7/30/2022 MYLES Sol    dicyclomine (BENTYL) 10 MG capsule Take 1 capsule (10 mg total) by mouth 4 (four) times daily as needed (abdominal cramping). 20 capsule 7/27/2022 8/1/2022 MYLES Sol        Follow-up Information     Follow up With Specialties Details Why Contact Info    [facility] Emergency [facility]  If symptoms worsen return to ED immediately [address] [facility] [id number] [phone]    Primary Care Provider within 1-2 days  Go in 1 day             MYLES Sol  07/27/22 0101       Janes Gallardo MD  07/28/22 0686

## 2022-07-29 ENCOUNTER — TELEPHONE (OUTPATIENT)
Dept: URGENT CARE | Facility: CLINIC | Age: 32
End: 2022-07-29
Payer: MEDICAID

## 2022-07-29 NOTE — PROGRESS NOTES
Spoke with patient, informed her that she did not have Giardia, could stop the flagyl but continue the cipro. Pt states she is no longer having diarrhea and feeling much improved.

## 2022-07-29 NOTE — TELEPHONE ENCOUNTER
----- Message from Lizzie Arzola NP sent at 7/29/2022  2:31 PM CDT -----  Spoke with patient, informed her that she did not have Giardia, could stop the flagyl but continue the cipro. Pt states she is no longer having diarrhea and feeling much improved.

## 2022-07-30 LAB — BACTERIA STL CULT: ABNORMAL

## 2022-09-21 ENCOUNTER — HISTORICAL (OUTPATIENT)
Dept: ADMINISTRATIVE | Facility: HOSPITAL | Age: 32
End: 2022-09-21
Payer: MEDICAID

## 2022-11-07 ENCOUNTER — HOSPITAL ENCOUNTER (EMERGENCY)
Facility: HOSPITAL | Age: 32
Discharge: HOME | End: 2022-11-07
Attending: FAMILY MEDICINE
Payer: MEDICAID

## 2022-11-07 VITALS
WEIGHT: 126 LBS | OXYGEN SATURATION: 100 % | DIASTOLIC BLOOD PRESSURE: 71 MMHG | HEART RATE: 76 BPM | TEMPERATURE: 98 F | RESPIRATION RATE: 18 BRPM | SYSTOLIC BLOOD PRESSURE: 110 MMHG | BODY MASS INDEX: 23.81 KG/M2

## 2022-11-07 DIAGNOSIS — B34.9 VIRAL ILLNESS: Primary | ICD-10-CM

## 2022-11-07 LAB
B-HCG UR QL: NEGATIVE
CTP QC/QA: YES
FLUAV AG UPPER RESP QL IA.RAPID: NOT DETECTED
FLUBV AG UPPER RESP QL IA.RAPID: NOT DETECTED
SARS-COV-2 RNA RESP QL NAA+PROBE: NOT DETECTED
STREP A PCR (OHS): NOT DETECTED

## 2022-11-07 PROCEDURE — 0241U COVID/FLU A&B PCR: CPT | Performed by: FAMILY MEDICINE

## 2022-11-07 PROCEDURE — 87651 STREP A DNA AMP PROBE: CPT | Performed by: FAMILY MEDICINE

## 2022-11-07 PROCEDURE — 99283 EMERGENCY DEPT VISIT LOW MDM: CPT

## 2022-11-07 PROCEDURE — 81025 URINE PREGNANCY TEST: CPT | Performed by: FAMILY MEDICINE

## 2022-11-07 PROCEDURE — 25000003 PHARM REV CODE 250: Performed by: FAMILY MEDICINE

## 2022-11-07 RX ORDER — IBUPROFEN 800 MG/1
800 TABLET ORAL EVERY 8 HOURS PRN
Qty: 15 TABLET | Refills: 0 | Status: SHIPPED | OUTPATIENT
Start: 2022-11-07 | End: 2024-01-11

## 2022-11-07 RX ORDER — BENZONATATE 100 MG/1
100 CAPSULE ORAL 3 TIMES DAILY PRN
Qty: 20 CAPSULE | Refills: 0 | Status: SHIPPED | OUTPATIENT
Start: 2022-11-07 | End: 2022-11-07

## 2022-11-07 RX ORDER — ACETAMINOPHEN 500 MG
1000 TABLET ORAL
Status: COMPLETED | OUTPATIENT
Start: 2022-11-07 | End: 2022-11-07

## 2022-11-07 RX ADMIN — ACETAMINOPHEN 1000 MG: 500 TABLET ORAL at 06:11

## 2022-11-07 NOTE — ED PROVIDER NOTES
Encounter Date: 11/7/2022       History     Chief Complaint   Patient presents with    Fever     Fever onset today, flu -like symptoms today     Pt presents with flu like symptoms today.      The history is provided by the patient. No  was used.   Illness   The current episode started just prior to arrival. The problem occurs rarely. The problem has been unchanged. Nothing relieves the symptoms. Nothing aggravates the symptoms. Associated symptoms include a fever, congestion, rhinorrhea, sore throat, muscle aches and cough. Pertinent negatives include no abdominal pain, no nausea, no vomiting, no neck pain, no neck stiffness and no rash.   Review of patient's allergies indicates:   Allergen Reactions    Erythromycin-sulfisoxazole      Past Medical History:   Diagnosis Date    Anxiety      Past Surgical History:   Procedure Laterality Date    ADENOIDECTOMY      TONSILLECTOMY      TYMPANOSTOMY TUBE PLACEMENT       Family History   Family history unknown: Yes     Social History     Tobacco Use    Smoking status: Never    Smokeless tobacco: Never   Substance Use Topics    Alcohol use: Yes     Comment: monthly    Drug use: Never     Review of Systems   Constitutional:  Positive for fatigue and fever.   HENT:  Positive for congestion, rhinorrhea and sore throat.    Respiratory:  Positive for cough.    Gastrointestinal:  Negative for abdominal pain, nausea and vomiting.   Musculoskeletal:  Negative for neck pain.   Skin:  Negative for rash.   Neurological:  Negative for dizziness and weakness.   All other systems reviewed and are negative.    Physical Exam     Initial Vitals [11/07/22 1648]   BP Pulse Resp Temp SpO2   113/76 101 18 (!) 101.4 °F (38.6 °C) 100 %      MAP       --         Physical Exam    Nursing note and vitals reviewed.  Constitutional: She appears well-developed and well-nourished. No distress.   Patient is not ill or septic appearing.   HENT:   Head: Normocephalic and atraumatic.    Eyes: Conjunctivae are normal.   Cardiovascular:  Normal heart sounds and intact distal pulses.           Pulmonary/Chest: Breath sounds normal. No respiratory distress. She has no wheezes. She has no rales.   Abdominal: Abdomen is soft. Bowel sounds are normal. There is no abdominal tenderness. There is no rebound and no guarding.   Musculoskeletal:         General: Normal range of motion.     Neurological: She is alert and oriented to person, place, and time. Gait normal.   Skin: Skin is warm and dry.   Psychiatric: She has a normal mood and affect. Her behavior is normal. Judgment and thought content normal.       ED Course   Procedures  Labs Reviewed   COVID/FLU A&B PCR - Normal    Narrative:     The Xpert Xpress SARS-CoV-2/FLU/RSV plus is a rapid, multiplexed real-time PCR test intended for the simultaneous qualitative detection and differentiation of SARS-CoV-2, Influenza A, Influenza B, and respiratory syncytial virus (RSV) viral RNA in either nasopharyngeal swab or nasal swab specimens.         STREP GROUP A BY PCR - Normal    Narrative:     The Xpert Xpress Strep A test is a rapid, qualitative in vitro diagnostic test for the detection of Streptococcus pyogenes (Group A ß-hemolytic Streptococcus, Strep A) in throat swab specimens from patients with signs and symptoms of pharyngitis.     POCT URINE PREGNANCY          Imaging Results    None          Medications   acetaminophen tablet 1,000 mg (1,000 mg Oral Given 11/7/22 1824)     Medical Decision Making:   Patient presents with viral syndrome.  Advised symptomatic treatment with over-the-counter meds as needed.  Strict ER precautions have been given to the patient who verbalized understanding.  Patient is stable for discharge.                        Clinical Impression:   Final diagnoses:  [B34.9] Viral illness (Primary)      ED Disposition Condition    Discharge Stable          ED Prescriptions       Medication Sig Dispense Start Date End Date Auth.  Provider    benzonatate (TESSALON) 100 MG capsule Take 1 capsule (100 mg total) by mouth 3 (three) times daily as needed for Cough. 20 capsule 11/7/2022 11/17/2022 Manda Trotter MD          Follow-up Information       Follow up With Specialties Details Why Contact Info    [facility] Emergency Medicine  As needed, If symptoms worsen [address] [facility] [address] [phone]             Manda Trotter MD  11/07/22 2115

## 2022-11-07 NOTE — Clinical Note
"Aliya Helm" Kieran was seen and treated in our emergency department on 11/7/2022.  She may return to work on 11/10/2022.       If you have any questions or concerns, please don't hesitate to call.      PAULINA DUPONT    "

## 2022-12-11 ENCOUNTER — OFFICE VISIT (OUTPATIENT)
Dept: URGENT CARE | Facility: CLINIC | Age: 32
End: 2022-12-11
Payer: MEDICAID

## 2022-12-11 VITALS
SYSTOLIC BLOOD PRESSURE: 110 MMHG | HEART RATE: 84 BPM | BODY MASS INDEX: 23.63 KG/M2 | WEIGHT: 125.13 LBS | OXYGEN SATURATION: 98 % | TEMPERATURE: 98 F | RESPIRATION RATE: 20 BRPM | HEIGHT: 61 IN | DIASTOLIC BLOOD PRESSURE: 74 MMHG

## 2022-12-11 DIAGNOSIS — R09.81 NASAL CONGESTION: ICD-10-CM

## 2022-12-11 DIAGNOSIS — J02.9 SORE THROAT: ICD-10-CM

## 2022-12-11 DIAGNOSIS — Z11.52 ENCOUNTER FOR SCREENING FOR COVID-19: ICD-10-CM

## 2022-12-11 DIAGNOSIS — H66.92 LEFT OTITIS MEDIA, UNSPECIFIED OTITIS MEDIA TYPE: Primary | ICD-10-CM

## 2022-12-11 DIAGNOSIS — R68.89 FLU-LIKE SYMPTOMS: ICD-10-CM

## 2022-12-11 LAB
CTP QC/QA: YES
CTP QC/QA: YES
FLUAV AG NPH QL: NEGATIVE
FLUBV AG NPH QL: NEGATIVE
SARS-COV-2 RDRP RESP QL NAA+PROBE: NEGATIVE
STREP A PCR (OHS): NOT DETECTED

## 2022-12-11 PROCEDURE — 87635 SARS-COV-2 COVID-19 AMP PRB: CPT | Mod: PBBFAC | Performed by: NURSE PRACTITIONER

## 2022-12-11 PROCEDURE — 99213 OFFICE O/P EST LOW 20 MIN: CPT | Mod: PBBFAC | Performed by: NURSE PRACTITIONER

## 2022-12-11 PROCEDURE — 87651 STREP A DNA AMP PROBE: CPT | Performed by: NURSE PRACTITIONER

## 2022-12-11 PROCEDURE — 99214 PR OFFICE/OUTPT VISIT, EST, LEVL IV, 30-39 MIN: ICD-10-PCS | Mod: S$PBB,,, | Performed by: NURSE PRACTITIONER

## 2022-12-11 PROCEDURE — 99214 OFFICE O/P EST MOD 30 MIN: CPT | Mod: S$PBB,,, | Performed by: NURSE PRACTITIONER

## 2022-12-11 PROCEDURE — 87804 INFLUENZA ASSAY W/OPTIC: CPT | Mod: PBBFAC | Performed by: NURSE PRACTITIONER

## 2022-12-11 RX ORDER — FLUTICASONE PROPIONATE 50 MCG
1 SPRAY, SUSPENSION (ML) NASAL DAILY
Qty: 16 ML | Refills: 0 | Status: SHIPPED | OUTPATIENT
Start: 2022-12-11

## 2022-12-11 RX ORDER — PHENIRAMINE MALEATE, PHENYLEPHRINE HCL 17.5; 1 MG/1; MG/1
TABLET ORAL
COMMUNITY
Start: 2022-09-08

## 2022-12-11 RX ORDER — CETIRIZINE HYDROCHLORIDE 10 MG/1
10 TABLET ORAL DAILY
Qty: 30 TABLET | Refills: 0 | Status: SHIPPED | OUTPATIENT
Start: 2022-12-11 | End: 2023-11-15

## 2022-12-11 RX ORDER — HYDROXYZINE PAMOATE 25 MG/1
25-50 CAPSULE ORAL 2 TIMES DAILY PRN
COMMUNITY
Start: 2022-08-02

## 2022-12-11 RX ORDER — AMOXICILLIN AND CLAVULANATE POTASSIUM 875; 125 MG/1; MG/1
1 TABLET, FILM COATED ORAL EVERY 12 HOURS
Qty: 20 TABLET | Refills: 0 | Status: SHIPPED | OUTPATIENT
Start: 2022-12-11 | End: 2022-12-21

## 2022-12-11 RX ORDER — FAMOTIDINE 40 MG/1
40 TABLET, FILM COATED ORAL NIGHTLY
COMMUNITY
Start: 2022-07-21

## 2022-12-11 RX ORDER — MONTELUKAST SODIUM 10 MG/1
10 TABLET ORAL NIGHTLY
COMMUNITY
Start: 2022-11-19

## 2022-12-11 RX ORDER — HYDROXYZINE HYDROCHLORIDE 10 MG/1
10 TABLET, FILM COATED ORAL DAILY PRN
COMMUNITY
Start: 2022-10-22

## 2022-12-11 RX ORDER — DEXAMETHASONE SODIUM PHOSPHATE 100 MG/10ML
8 INJECTION INTRAMUSCULAR; INTRAVENOUS
Status: COMPLETED | OUTPATIENT
Start: 2022-12-11 | End: 2022-12-11

## 2022-12-11 RX ADMIN — DEXAMETHASONE SODIUM PHOSPHATE 8 MG: 100 INJECTION INTRAMUSCULAR; INTRAVENOUS at 03:12

## 2022-12-11 NOTE — PROGRESS NOTES
"Subjective:       Patient ID: Aliya Alcaraz is a 32 y.o. female.    Vitals:  height is 5' 1" (1.549 m) and weight is 56.7 kg (125 lb 1.6 oz). Her oral temperature is 97.7 °F (36.5 °C). Her blood pressure is 110/74 and her pulse is 84. Her respiration is 20 and oxygen saturation is 98%.     Chief Complaint: Sore Throat (Starting yesterday ), Cough (Coughing up yellow mucus), and Otalgia (Bilateral)    CC as above. Taking mucinex DM for symptoms      Constitution: Negative.   HENT:  Positive for ear pain and congestion.    Neck: neck negative.   Cardiovascular: Negative.    Respiratory:  Positive for cough.      Objective:      Physical Exam   Constitutional: She is oriented to person, place, and time. She appears well-developed.   HENT:   Head: Normocephalic.   Ears:   Right Ear: Tympanic membrane normal.   Left Ear: A middle ear effusion is present.   Nose: Congestion present.   Mouth/Throat: Oropharynx is clear.   Eyes: Conjunctivae and EOM are normal. Pupils are equal, round, and reactive to light.   Neck: Neck supple.   Cardiovascular: Normal rate, regular rhythm and normal heart sounds.   Pulmonary/Chest: Effort normal and breath sounds normal.   Musculoskeletal: Normal range of motion.         General: Normal range of motion.   Neurological: She is alert and oriented to person, place, and time.   Skin: Skin is warm and dry.   Psychiatric: Her behavior is normal.   Vitals reviewed.      Assessment:       1. Left otitis media, unspecified otitis media type    2. Encounter for screening for COVID-19    3. Flu-like symptoms    4. Sore throat    5. Nasal congestion              Office Visit on 12/11/2022   Component Date Value Ref Range Status    POC Rapid COVID 12/11/2022 Negative  Negative Final     Acceptable 12/11/2022 Yes   Final    Rapid Influenza A Ag 12/11/2022 Negative  Negative Final    Rapid Influenza B Ag 12/11/2022 Negative  Negative Final     Acceptable 12/11/2022 Yes   " Final        No results found.   Plan:         Strep pcr pending, will notify of abnormal results.  Decadron 8mg IM in office.   Medication as ordered. May use humidifier.  If any shortness of breath, wheezing, continued fevers or any new symptoms then immediately go to ER.    Left otitis media, unspecified otitis media type  -     dexAMETHasone injection 8 mg  -     amoxicillin-clavulanate 875-125mg (AUGMENTIN) 875-125 mg per tablet; Take 1 tablet by mouth every 12 (twelve) hours. for 10 days  Dispense: 20 tablet; Refill: 0    Encounter for screening for COVID-19  -     POCT COVID-19 Rapid Screening    Flu-like symptoms  -     POCT Influenza A/B    Sore throat  -     Strep Group A by PCR    Nasal congestion  -     dexAMETHasone injection 8 mg  -     cetirizine (ZYRTEC) 10 MG tablet; Take 1 tablet (10 mg total) by mouth once daily.  Dispense: 30 tablet; Refill: 0  -     fluticasone propionate (FLONASE) 50 mcg/actuation nasal spray; 1 spray (50 mcg total) by Each Nostril route once daily.  Dispense: 16 mL; Refill: 0

## 2022-12-11 NOTE — LETTER
December 11, 2022      [facility]  [facility] [address]  Phone: [phone]       Patient: Aliya Alcaraz   YOB: 1990  Date of Visit: 12/11/2022    To Whom It May Concern:    Mikey Alcaraz  was at [facility] on 12/11/2022. The patient may return to work/school on 12/13/2022 with no restrictions. If you have any questions or concerns, or if I can be of further assistance, please do not hesitate to contact me.    Sincerely,    Zuhair Grant NP

## 2023-11-15 ENCOUNTER — OFFICE VISIT (OUTPATIENT)
Dept: URGENT CARE | Facility: CLINIC | Age: 33
End: 2023-11-15
Payer: COMMERCIAL

## 2023-11-15 VITALS
HEART RATE: 119 BPM | RESPIRATION RATE: 20 BRPM | OXYGEN SATURATION: 97 % | DIASTOLIC BLOOD PRESSURE: 75 MMHG | HEIGHT: 61 IN | BODY MASS INDEX: 25.49 KG/M2 | SYSTOLIC BLOOD PRESSURE: 112 MMHG | TEMPERATURE: 100 F | WEIGHT: 135 LBS

## 2023-11-15 DIAGNOSIS — U07.1 COVID-19: ICD-10-CM

## 2023-11-15 DIAGNOSIS — R50.9 FEVER, UNSPECIFIED FEVER CAUSE: Primary | ICD-10-CM

## 2023-11-15 LAB
CTP QC/QA: YES
MOLECULAR STREP A: NEGATIVE
POC MOLECULAR INFLUENZA A AGN: NEGATIVE
POC MOLECULAR INFLUENZA B AGN: NEGATIVE
SARS-COV-2 RDRP RESP QL NAA+PROBE: POSITIVE

## 2023-11-15 PROCEDURE — 87502 INFLUENZA DNA AMP PROBE: CPT | Mod: PBBFAC

## 2023-11-15 PROCEDURE — 99214 PR OFFICE/OUTPT VISIT, EST, LEVL IV, 30-39 MIN: ICD-10-PCS | Mod: S$PBB,,,

## 2023-11-15 PROCEDURE — 87651 STREP A DNA AMP PROBE: CPT | Mod: PBBFAC

## 2023-11-15 PROCEDURE — 87635 SARS-COV-2 COVID-19 AMP PRB: CPT | Mod: PBBFAC

## 2023-11-15 PROCEDURE — 99214 OFFICE O/P EST MOD 30 MIN: CPT | Mod: PBBFAC

## 2023-11-15 PROCEDURE — 99214 OFFICE O/P EST MOD 30 MIN: CPT | Mod: S$PBB,,,

## 2023-11-15 RX ORDER — PAROXETINE 30 MG/1
30 TABLET, FILM COATED ORAL NIGHTLY
COMMUNITY
Start: 2023-09-19

## 2023-11-15 NOTE — LETTER
November 15, 2023      [facility]  [facility] [address]  Phone: [phone]       Patient: Aliya Alcaraz   YOB: 1990  Date of Visit: 11/15/2023    To Whom It May Concern:    Mikey Alcaraz  was at [facility] on 11/15/2023. The patient may return to work/school on 11/20/2023. with no restrictions. If you have any questions or concerns, or if I can be of further assistance, please do not hesitate to contact me.    Sincerely,    HOLLIE Colbert

## 2023-11-15 NOTE — PROGRESS NOTES
"Subjective:       Patient ID: Aliya Alcaraz is a 33 y.o. female.    Vitals:  height is 5' 1" (1.549 m) and weight is 61.2 kg (135 lb). Her oral temperature is 100 °F (37.8 °C). Her blood pressure is 112/75 and her pulse is 119 (abnormal). Her respiration is 20 and oxygen saturation is 97%.     Chief Complaint: URI (Cough, nasal congestion, chills, ear pain, sore throat, headache, chest congestion, chest pain and body aches x 3 days.)    Patients reports chills with headache x 3 days. Unsure of febrile status at home, does not own a thermometer.     URI   This is a new problem. The current episode started in the past 7 days. The problem has been gradually worsening. Associated symptoms include coughing, headaches, nausea and a sore throat. Pertinent negatives include no abdominal pain, chest pain, congestion, diarrhea, ear pain, sinus pain or vomiting. Treatments tried: Delysum. The treatment provided moderate relief.       Constitution: Positive for appetite change, chills and fatigue.   HENT:  Positive for sore throat. Negative for ear pain, congestion, sinus pain and sinus pressure.    Cardiovascular:  Negative for chest pain and palpitations.   Respiratory:  Positive for cough and sputum production. Negative for bloody sputum and asthma.    Gastrointestinal:  Positive for nausea. Negative for abdominal pain, vomiting and diarrhea.   Allergic/Immunologic: Negative for asthma.   Neurological:  Positive for headaches. Negative for dizziness.       Objective:      Physical Exam   Constitutional: She is oriented to person, place, and time. She is cooperative. awake  HENT:   Head: Normocephalic and atraumatic.   Ears:   Right Ear: A middle ear effusion is present.   Left Ear: Tympanic membrane normal.   Mouth/Throat: Uvula is midline, oropharynx is clear and moist and mucous membranes are normal. Tonsils are 0 on the right. Tonsils are 0 on the left.   Eyes: Conjunctivae and lids are normal.   Neck: Neck supple. "   Cardiovascular: Normal heart sounds. Tachycardia present.   Pulses:       Radial pulses are 2+ on the right side and 2+ on the left side.   Pulmonary/Chest: Effort normal and breath sounds normal.   Abdominal: Normal appearance. Soft. Bowel sounds are increased. flat abdomen There is no abdominal tenderness.   Musculoskeletal:      Right lower leg: No edema.      Left lower leg: No edema.   Lymphadenopathy:     She has no cervical adenopathy.   Neurological: She is alert and oriented to person, place, and time. GCS eye subscore is 4. GCS verbal subscore is 5. GCS motor subscore is 6.   Skin: Skin is warm, dry and intact.   Psychiatric: Her speech is normal and behavior is normal.   Nursing note and vitals reviewed.        Assessment:       1. Fever, unspecified fever cause    2. COVID-19          Plan:     COVID is positive today in clinic. May alternate with Tylenol/Motrin for fever and body aches. Increase your oral fluid intake. Quarantine for 5 days onset of symptoms. ED precautions discussed. Patient is stable to discharge.     Fever, unspecified fever cause  -     POCT Strep A, Molecular  -     POCT Influenza A/B MOLECULAR  -     POCT COVID-19 Rapid Screening    COVID-19           Results for orders placed or performed in visit on 11/15/23   POCT Strep A, Molecular   Result Value Ref Range    Molecular Strep A, POC Negative Negative     Acceptable Yes    POCT Influenza A/B MOLECULAR   Result Value Ref Range    POC Molecular Influenza A Ag Negative Negative, Not Reported    POC Molecular Influenza B Ag Negative Negative, Not Reported     Acceptable Yes    POCT COVID-19 Rapid Screening   Result Value Ref Range    POC Rapid COVID Positive (A) Negative     Acceptable Yes

## 2023-12-26 ENCOUNTER — HOSPITAL ENCOUNTER (OUTPATIENT)
Dept: RADIOLOGY | Facility: HOSPITAL | Age: 33
Discharge: HOME | End: 2023-12-26
Attending: STUDENT IN AN ORGANIZED HEALTH CARE EDUCATION/TRAINING PROGRAM
Payer: COMMERCIAL

## 2023-12-26 ENCOUNTER — OFFICE VISIT (OUTPATIENT)
Dept: URGENT CARE | Facility: CLINIC | Age: 33
End: 2023-12-26
Payer: COMMERCIAL

## 2023-12-26 VITALS
WEIGHT: 140 LBS | DIASTOLIC BLOOD PRESSURE: 74 MMHG | BODY MASS INDEX: 26.43 KG/M2 | HEART RATE: 65 BPM | RESPIRATION RATE: 20 BRPM | SYSTOLIC BLOOD PRESSURE: 108 MMHG | TEMPERATURE: 98 F | OXYGEN SATURATION: 100 % | HEIGHT: 61 IN

## 2023-12-26 DIAGNOSIS — R05.1 ACUTE COUGH: ICD-10-CM

## 2023-12-26 DIAGNOSIS — R09.1 PLEURITIS: Primary | ICD-10-CM

## 2023-12-26 PROCEDURE — 99213 OFFICE O/P EST LOW 20 MIN: CPT | Mod: S$PBB,,, | Performed by: STUDENT IN AN ORGANIZED HEALTH CARE EDUCATION/TRAINING PROGRAM

## 2023-12-26 PROCEDURE — 99213 PR OFFICE/OUTPT VISIT, EST, LEVL III, 20-29 MIN: ICD-10-PCS | Mod: S$PBB,,, | Performed by: STUDENT IN AN ORGANIZED HEALTH CARE EDUCATION/TRAINING PROGRAM

## 2023-12-26 PROCEDURE — 99215 OFFICE O/P EST HI 40 MIN: CPT | Mod: PBBFAC,25 | Performed by: STUDENT IN AN ORGANIZED HEALTH CARE EDUCATION/TRAINING PROGRAM

## 2023-12-26 PROCEDURE — 63600175 PHARM REV CODE 636 W HCPCS: Performed by: STUDENT IN AN ORGANIZED HEALTH CARE EDUCATION/TRAINING PROGRAM

## 2023-12-26 PROCEDURE — 71046 X-RAY EXAM CHEST 2 VIEWS: CPT | Mod: TC

## 2023-12-26 RX ORDER — BENZONATATE 100 MG/1
CAPSULE ORAL
COMMUNITY
Start: 2023-12-13

## 2023-12-26 RX ORDER — DEXAMETHASONE SODIUM PHOSPHATE 4 MG/ML
8 INJECTION, SOLUTION INTRA-ARTICULAR; INTRALESIONAL; INTRAMUSCULAR; INTRAVENOUS; SOFT TISSUE ONCE
Status: COMPLETED | OUTPATIENT
Start: 2023-12-26 | End: 2023-12-26

## 2023-12-26 RX ORDER — DEXAMETHASONE SODIUM PHOSPHATE 4 MG/ML
8 INJECTION, SOLUTION INTRA-ARTICULAR; INTRALESIONAL; INTRAMUSCULAR; INTRAVENOUS; SOFT TISSUE ONCE
Status: DISCONTINUED | OUTPATIENT
Start: 2023-12-26 | End: 2023-12-26

## 2023-12-26 RX ORDER — AZITHROMYCIN 250 MG/1
TABLET, FILM COATED ORAL
Qty: 6 TABLET | Refills: 0 | Status: SHIPPED | OUTPATIENT
Start: 2023-12-26 | End: 2023-12-26

## 2023-12-26 RX ORDER — SUMATRIPTAN SUCCINATE 25 MG/1
TABLET ORAL
COMMUNITY

## 2023-12-26 RX ORDER — PROMETHAZINE HYDROCHLORIDE AND DEXTROMETHORPHAN HYDROBROMIDE 6.25; 15 MG/5ML; MG/5ML
SYRUP ORAL
COMMUNITY
Start: 2023-12-13

## 2023-12-26 RX ORDER — ALBUTEROL SULFATE 90 UG/1
2 AEROSOL, METERED RESPIRATORY (INHALATION) EVERY 4 HOURS PRN
COMMUNITY
Start: 2023-12-13 | End: 2024-06-10

## 2023-12-26 RX ADMIN — DEXAMETHASONE SODIUM PHOSPHATE 8 MG: 4 INJECTION, SOLUTION INTRA-ARTICULAR; INTRALESIONAL; INTRAMUSCULAR; INTRAVENOUS; SOFT TISSUE at 06:12

## 2023-12-27 NOTE — PROGRESS NOTES
"Subjective:      Patient ID: Aliya Alcaraz is a 33 y.o. female.    Vitals:  height is 5' 1" (1.549 m) and weight is 63.5 kg (140 lb). Her oral temperature is 97.9 °F (36.6 °C). Her blood pressure is 108/74 and her pulse is 65. Her respiration is 20 and oxygen saturation is 100%.     Chief Complaint: Cough (Cough with chest congestion, wheezing and pain. Patient reports coughing up yellow, green and blood tinged phlegm. Patient also reports having Covid 11/15/23 and still has the cough with chest pain.)    Cough      Aliya Alcaraz is a 33-year-old female presenting to the urgent care with left-sided chest pain that has been going on for the past couple of weeks.  Patient states that left-sided chest pain is exacerbated by deep breathing and turning on the left.  Pain is relieved by exhaling or when patient is not taking a breath.  Patient does report that she was diagnosed with COVID-19 on 11/15/2023.  Patient subsequently had sinusitis and was treated with doxycycline starting on 12/14/2023.  Patient states that she finished the cure for her sinusitis.  Overall she feels like the cough, chest congestion has gotten better, however she is still having chest pain with deep breathing    Respiratory:  Positive for cough.       Objective:     Physical Exam   Constitutional: She is oriented to person, place, and time. She appears well-developed. She is cooperative.  Non-toxic appearance. She does not appear ill. No distress.   HENT:   Head: Normocephalic and atraumatic.   Ears:   Right Ear: Hearing, tympanic membrane, external ear and ear canal normal.   Left Ear: Hearing, tympanic membrane, external ear and ear canal normal.   Nose: Nose normal. No mucosal edema, rhinorrhea or nasal deformity. No epistaxis. Right sinus exhibits no maxillary sinus tenderness and no frontal sinus tenderness. Left sinus exhibits no maxillary sinus tenderness and no frontal sinus tenderness.   Mouth/Throat: Uvula is midline, oropharynx is clear " and moist and mucous membranes are normal. No trismus in the jaw. Normal dentition. No uvula swelling. No oropharyngeal exudate, posterior oropharyngeal edema or posterior oropharyngeal erythema.   Eyes: Conjunctivae and lids are normal. No scleral icterus.   Neck: Trachea normal and phonation normal. Neck supple. No edema present. No erythema present. No neck rigidity present.   Cardiovascular: Normal rate, regular rhythm, normal heart sounds and normal pulses.   Pulmonary/Chest: Effort normal and breath sounds normal. No respiratory distress. She has no decreased breath sounds. She has no rhonchi.   Abdominal: Normal appearance.   Musculoskeletal: Normal range of motion.         General: No deformity. Normal range of motion.   Neurological: She is alert and oriented to person, place, and time. She exhibits normal muscle tone. Coordination normal.   Skin: Skin is warm, dry, intact, not diaphoretic and not pale.   Psychiatric: Her speech is normal and behavior is normal. Judgment and thought content normal.   Nursing note and vitals reviewed.      Assessment:     1. Pleuritis    2. Acute cough        Plan:     Patient presents to the urgent care clinic with 1 week of left-sided chest pain, exacerbated by deep breathing.  History and examination not concerning for pneumonia.  Chest x-ray done in the office without any acute findings.  We will treat patient for pleuritis.  We will give a dexamethasone injection today in the office.  Recommended NSAIDs for 5-7 days to help alleviate the symptoms.  Return to clinic/ER going precautions discussed with patient.    Pleuritis  -     dexAMETHasone injection 8 mg    Acute cough  -     XR CHEST PA AND LATERAL; Future; Expected date: 12/26/2023    This note is dictated using the M*Modal Fluency Direct word recognition program. There are word recognition mistakes that are occasionally missed on review.    Junaid Waite MD

## 2024-01-11 ENCOUNTER — HOSPITAL ENCOUNTER (EMERGENCY)
Facility: HOSPITAL | Age: 34
Discharge: HOME | End: 2024-01-11
Attending: EMERGENCY MEDICINE
Payer: COMMERCIAL

## 2024-01-11 VITALS
WEIGHT: 130 LBS | OXYGEN SATURATION: 99 % | DIASTOLIC BLOOD PRESSURE: 83 MMHG | RESPIRATION RATE: 18 BRPM | TEMPERATURE: 98 F | HEART RATE: 88 BPM | HEIGHT: 61 IN | SYSTOLIC BLOOD PRESSURE: 122 MMHG | BODY MASS INDEX: 24.55 KG/M2

## 2024-01-11 DIAGNOSIS — S86.112A GASTROCNEMIUS MUSCLE TEAR, LEFT, INITIAL ENCOUNTER: Primary | ICD-10-CM

## 2024-01-11 DIAGNOSIS — R52 PAIN: ICD-10-CM

## 2024-01-11 PROCEDURE — 99284 EMERGENCY DEPT VISIT MOD MDM: CPT

## 2024-01-11 PROCEDURE — 63600175 PHARM REV CODE 636 W HCPCS: Performed by: EMERGENCY MEDICINE

## 2024-01-11 PROCEDURE — 96372 THER/PROPH/DIAG INJ SC/IM: CPT | Performed by: EMERGENCY MEDICINE

## 2024-01-11 RX ORDER — IBUPROFEN 800 MG/1
800 TABLET ORAL EVERY 8 HOURS PRN
Qty: 15 TABLET | Refills: 0 | Status: SHIPPED | OUTPATIENT
Start: 2024-01-11

## 2024-01-11 RX ORDER — HYDROCODONE BITARTRATE AND ACETAMINOPHEN 10; 325 MG/1; MG/1
1 TABLET ORAL EVERY 6 HOURS PRN
Qty: 12 TABLET | Refills: 0 | Status: SHIPPED | OUTPATIENT
Start: 2024-01-11

## 2024-01-11 RX ORDER — KETOROLAC TROMETHAMINE 30 MG/ML
15 INJECTION, SOLUTION INTRAMUSCULAR; INTRAVENOUS
Status: COMPLETED | OUTPATIENT
Start: 2024-01-11 | End: 2024-01-11

## 2024-01-11 RX ADMIN — KETOROLAC TROMETHAMINE 15 MG: 30 INJECTION, SOLUTION INTRAMUSCULAR; INTRAVENOUS at 10:01

## 2024-01-11 NOTE — Clinical Note
"Aliya Alcaraz (Brandi) was seen and treated in our emergency department on 1/11/2024.  She may return to work on 01/15/2024.       If you have any questions or concerns, please don't hesitate to call.       RN    "

## 2024-01-12 NOTE — ED PROVIDER NOTES
"ED PROVIDER NOTE  1/11/2024    CHIEF COMPLAINT:   Chief Complaint   Patient presents with    Leg Pain     Pt presents to ed with reports of L calf pain after walking [place] stairs and feeling a "pop" approx 30 min PTA. Pt states unable to bare weight at this time. No obvious deformity, bruising or swelling noted at present.        HISTORY OF PRESENT ILLNESS:   Aliya Alcaraz is a 33 y.o. female who presents with chief complaint Calf pain.  Onset was today whenever she was walking downstairs and felt a pop with sudden onset of pain in her left calf that is aggravated with extending her leg and standing and walking, improved with sitting and flexing her knee.  She had not fall result of this.  Denies any previous history of the same.    The history is provided by the patient.         REVIEW OF SYSTEMS: as noted in the HPI.  NURSING NOTES REVIEWED      PAST MEDICAL/SURGICAL HISTORY:   Past Medical History:   Diagnosis Date    Anxiety       Past Surgical History:   Procedure Laterality Date    ADENOIDECTOMY      TONSILLECTOMY      TYMPANOSTOMY TUBE PLACEMENT         FAMILY HISTORY:   Family History   Family history unknown: Yes       SOCIAL HISTORY:   Social History     Tobacco Use    Smoking status: Never    Smokeless tobacco: Never   Substance Use Topics    Alcohol use: Yes     Comment: monthly    Drug use: Never       ALLERGIES:   Review of patient's allergies indicates:   Allergen Reactions    Erythromycin-sulfisoxazole Rash       PHYSICAL EXAM:  Initial Vitals [01/11/24 2107]   BP Pulse Resp Temp SpO2   113/75 84 18 98.4 °F (36.9 °C) 99 %      MAP       --         Physical Exam    Nursing note and vitals reviewed.  Constitutional: She appears well-developed and well-nourished.   HENT:   Head: Normocephalic and atraumatic.   Mouth/Throat: Uvula is midline and mucous membranes are normal.   Eyes: EOM are normal. Pupils are equal, round, and reactive to light.   Neck: Trachea normal. Neck supple.   Cardiovascular:  Normal " rate, regular rhythm and normal pulses.           Pulmonary/Chest: Effort normal and breath sounds normal.   Abdominal: Abdomen is soft. Bowel sounds are normal. There is no rebound and no guarding.   Musculoskeletal:         General: Tenderness present.      Cervical back: Neck supple.      Comments: Tenderness to palpation of left gastrocnemius muscle, pain with extending the knee and plantar flexion of the foot with the knee in extension less so when the knee is flexed, no palpable defect to the Achilles tendon.  Negative Fuchs test.     Neurological: She is alert and oriented to person, place, and time. GCS eye subscore is 4. GCS verbal subscore is 5. GCS motor subscore is 6.   Skin: Skin is warm and dry.   Psychiatric: She has a normal mood and affect. Her speech is normal. Thought content normal.         RESULTS:  Labs Reviewed - No data to display  Imaging Results              X-Ray Ankle Complete Left (Preliminary result)  Result time 01/11/24 22:34:57      Wet Read by Loi Liang DO (01/11/24 22:34:57, [facility], Emergency Medicine)    No displaced fractures.                                      PROCEDURES:  Procedures    ECG:       ED COURSE AND MEDICAL DECISION MAKING:  Medications   ketorolac injection 15 mg (15 mg Intramuscular Given 1/11/24 2237)           Medical Decision Making  33-year-old female who presents with left calf pain after she felt a pop sensation walking down stairs just prior to ED arrival.  She has tenderness to palpation of the gastrocnemius muscle and pain with dorsiflexion and plantar flexion of the left foot that is more pronounced when the knees extended.  Differential diagnosis includes gastrocnemius muscle tear, Achilles tendon tear.  Negative Fuchs test.  I do not feel any palpable defect along the Achilles tendon.  I suspect this is just a gastrocnemius tear.  We will give crutches and orthopedic referral, discussed symptomatic therapy to  continue at home and will provide a short course of hydrocodone for pain.  I counseled patient on risks associated with opioid medication including, but not limited to, physical dependence and/or addiction, confusion, constipation, drowsiness, nausea or vomiting, impairment in driving and/or operating machinery. I also advised the patient that taking more opioids than prescribed or mixing with other central nervous system depressants/sedatives, such as benzodiazepines or alcohol, can result in respiratory depression, hypoxic brain injury, coma, or death.  Given strict ED return precautions. I have spoken with the patient and/or caregivers. I have explained the patient's condition, diagnoses and treatment plan based on the information available to me at this time. I have answered the patient's and/or caregiver's questions and addressed any concerns. The patient and/or caregivers have as good an understanding of the patient's diagnosis, condition and treatment plan as can be expected at this point. The vital signs have been stable. The patient's condition is stable and appropriate for discharge from the emergency department.     The patient will pursue further outpatient evaluation with the primary care physician or other designated or consulting physician as outlined in the discharge instructions. The patient and/or caregivers are agreeable to this plan of care and follow-up instructions have been explained in detail. The patient and/or caregivers have received these instructions in written format and have expressed an understanding of the discharge instructions. The patient and/or caregivers are aware that any significant change in condition or worsening of symptoms should prompt an immediate return to this or the closest emergency department or a call to 911.      Amount and/or Complexity of Data Reviewed  Radiology: ordered and independent interpretation performed.    Risk  OTC drugs.  Prescription drug  management.        CLINICAL IMPRESSION:  1. Gastrocnemius muscle tear, left, initial encounter    2. Pain        DISPOSITION:   ED Disposition Condition    Discharge Stable            ED Prescriptions       Medication Sig Dispense Start Date End Date Auth. Provider    ibuprofen (ADVIL,MOTRIN) 800 MG tablet Take 1 tablet (800 mg total) by mouth every 8 (eight) hours as needed for Pain. 15 tablet 1/11/2024 -- Loi Liang DO    HYDROcodone-acetaminophen (NORCO)  mg per tablet Take 1 tablet by mouth every 6 (six) hours as needed for Pain. Final diagnoses: [R52] Pain [S86.112A] Gastrocnemius muscle tear, left, initial encounter (Primary) 12 tablet 1/11/2024 -- Loi Liang DO          Follow-up Information       Follow up With Specialties Details Why Contact Info    [facility] Emergency Medicine  If symptoms worsen [address] [facility] [id number] [phone]               Loi Liang DO  01/11/24 2195

## 2024-01-17 ENCOUNTER — OFFICE VISIT (OUTPATIENT)
Dept: ORTHOPEDICS | Facility: CLINIC | Age: 34
End: 2024-01-17
Payer: COMMERCIAL

## 2024-01-17 ENCOUNTER — HOSPITAL ENCOUNTER (OUTPATIENT)
Dept: RADIOLOGY | Facility: HOSPITAL | Age: 34
Discharge: HOME | End: 2024-01-17
Attending: STUDENT IN AN ORGANIZED HEALTH CARE EDUCATION/TRAINING PROGRAM
Payer: COMMERCIAL

## 2024-01-17 VITALS
TEMPERATURE: 98 F | SYSTOLIC BLOOD PRESSURE: 112 MMHG | BODY MASS INDEX: 25.94 KG/M2 | HEART RATE: 108 BPM | WEIGHT: 137.38 LBS | DIASTOLIC BLOOD PRESSURE: 73 MMHG | HEIGHT: 61 IN

## 2024-01-17 DIAGNOSIS — S86.112A GASTROCNEMIUS MUSCLE TEAR, LEFT, INITIAL ENCOUNTER: Primary | ICD-10-CM

## 2024-01-17 DIAGNOSIS — S86.112A GASTROCNEMIUS MUSCLE TEAR, LEFT, INITIAL ENCOUNTER: ICD-10-CM

## 2024-01-17 PROCEDURE — 73590 X-RAY EXAM OF LOWER LEG: CPT | Mod: TC,LT

## 2024-01-17 PROCEDURE — 99215 OFFICE O/P EST HI 40 MIN: CPT | Mod: PBBFAC

## 2024-01-17 RX ORDER — LEVOCETIRIZINE DIHYDROCHLORIDE 5 MG/1
5 TABLET, FILM COATED ORAL NIGHTLY
COMMUNITY

## 2024-01-17 NOTE — PROGRESS NOTES
"Subjective:    Patient ID: Aliya Alcaraz is a 33 y.o. female  who presented to [facility] for new visit..      Chief Complaint: Pain of the Left Lower Leg      History of Present Illness:    Aliya Alcaraz who has no formally previously diagnosed musculoskeletal condition presented today with left calf pain that began 1/11/23. She was going down stairs when she felt a pop in her calf area of her left leg, she fell down and was not able to put any weight on that leg afterwards.  She felt immediate pain but did not notice swelling until the following day.  She did not notice any bruising.  She felt like she had a muscle cramp in went to the ED that same day where she was given crutches Toradol injection, Norco and NSAIDs.  Today she has pain at the medial left calf.  Pain is 3/10 and has been improving since the injury. Quality of pain is described as Sharp and Aching, radiates down words towards her Achilles ankle area.  Pain is aggravated by  dorsiflexion and resisted plantar flexion . Evaluation to date: plain films, PCP evaluation, and ER evaluation. Treatment to date: oral analgesics, home exercises, and ice/heat.  Occupation includes [employer].     Ankle/Foot Review of Systems:  Swelling?  no  Instability?  no  Mechanical sx?  no  <30 min AM stiffness? yes  Limited ROM? yes  Fever/Chills? no       Objective:      Physical Exam:    /73   Pulse 108   Temp 97.6 °F (36.4 °C)   Ht 5' 1" (1.549 m)   Wt 62.3 kg (137 lb 6.4 oz)   LMP 01/07/2024 (Exact Date)   BMI 25.96 kg/m²     Appearance:  antalgic  PWB    Soft tissue swelling: Left: no Right: no  Effusion: Left:  Negative Right: Negative  Erythema: Left no Right: no  Ecchymosis: Left: no Right: no  Atrophy: Left: no Right: no    Palpation:  Ankle/Foot Tenderness: Left:  Mild TTP to left medial gastrocnemius. Right: non tender.    Range of motion:  Ankle dorsiflexion (20 degrees):     Left: 20 Right: " 20  Ankle Plantar flexion (50 degrees): Left 50 Right: 50  Subtalar inversion (5 degrees): Left: 5 Right 5  Subtalar eversion (5 degrees):  Left: 5 Right 5  Transverse/midtarsal: adduction (20 degrees): Left: 20 Right: 20  Transverse/midtarsal abduction (10 degrees): Left: 10 Right: 10    Strength:  Ankle Dorsiflexion:  4-5 on the left, no pain.  5/5 on the right no pain.  Ankle plantar flexion:  4-5 on the left no pain.  5/5 on the right.    Special Tests:  Fuchs:  Left: Not performed     Right: Not performed   Anterior drawer: Left: Negative     Right: Negative   Talar tilt: Left: Negative      Right: Negative   External rotation stress (Kleiger's): Left: Negative  Right: Negative  Eversion stress: Left: Negative Right: Negative   Squeeze: Left: Negative  Right: Negative  Heel rise: Left: Not performed Right: Not performed  Too many toes sign: Left: Not performed Right: Not performed      General appearance: NAD  Peripheral pulses: normal bilaterally   Reflexes: Left: normal Right normal   Sensation: normal    Labs:  Last A1c: The patient doesn't have any registry metric data available     Imaging:   Previous images reviewed.  X-rays ordered and performed today: yes  # of views: 3 Laterality: left  My Interpretation:   New avulsion fractures appreciated, there is soft tissue swelling of the posterior leg.     Limited ultrasound left posterior leg:  Minor hypoechoic area knee medial gastroc with mild to moderate disorganization as compared to the right side.    Assessment:        Encounter Diagnoses   Code Name Primary?    S86.112A Gastrocnemius muscle tear, left, initial encounter Yes        Plan:     MDM: Prior external referring provider notes reviewed. Prior external referring provider studies reviewed.   Dx:  Left medial gastroc strain versus tear.  Treatment Plan: Discussed with patient diagnosis, prognosis, and treatment recommendations. Education provided.    -recommend conservative management.  -start  walking boot for a total of 6 weeks to be worn while walking even at home..    -home ankle range-of-motion exercises daily.  HEP demonstrated to patient.  -patient may stop wearing the boot if she is feeling comfortable.  -follow up in 4 weeks.  Imaging: radiological studies ordered and independently reviewed; discussed with patient; pending radiologist interpretation.   Procedure:  None  Activity:  Weightbearing as tolerated in the boot; HEP to include ROM   Therapy: No formal therapy  Medication: CONTINUE previously prescribed medication see list . Please see your primary care physician for further refills.  RTC:  4 weeks.      Nabil Haines MD.  Note dictated using MModal.

## 2024-01-18 NOTE — PROGRESS NOTES
Faculty Attestation: Aliya Alcaraz  was seen in Sports Medicine Clinic. Discussed with Dr. Haines at the time of the visit. History of Present Illness, Physical Exam, and Assessment and Plan reviewed. Treatment plan is reasonable and appropriate. Compliance with treatment recommendations is important.  Radiology images independently reviewed and agree with fellow interpretation.  No procedure was performed.     Junaid Waite MD  Sports Medicine

## 2024-03-13 ENCOUNTER — OFFICE VISIT (OUTPATIENT)
Dept: URGENT CARE | Facility: CLINIC | Age: 34
End: 2024-03-13
Payer: COMMERCIAL

## 2024-03-13 VITALS
TEMPERATURE: 99 F | BODY MASS INDEX: 25.86 KG/M2 | HEART RATE: 71 BPM | RESPIRATION RATE: 18 BRPM | DIASTOLIC BLOOD PRESSURE: 75 MMHG | SYSTOLIC BLOOD PRESSURE: 111 MMHG | HEIGHT: 61 IN | WEIGHT: 137 LBS | OXYGEN SATURATION: 99 %

## 2024-03-13 DIAGNOSIS — J32.9 SINUSITIS, UNSPECIFIED CHRONICITY, UNSPECIFIED LOCATION: Primary | ICD-10-CM

## 2024-03-13 PROCEDURE — 99213 OFFICE O/P EST LOW 20 MIN: CPT | Mod: S$PBB,,, | Performed by: FAMILY MEDICINE

## 2024-03-13 PROCEDURE — 99215 OFFICE O/P EST HI 40 MIN: CPT | Mod: PBBFAC | Performed by: FAMILY MEDICINE

## 2024-03-13 RX ORDER — AMOXICILLIN AND CLAVULANATE POTASSIUM 875; 125 MG/1; MG/1
1 TABLET, FILM COATED ORAL
COMMUNITY
Start: 2024-03-06 | End: 2024-03-16

## 2024-03-13 NOTE — LETTER
March 13, 2024      [facility]  [address] [address]  Phone: [phone]       Patient: Aliya Alcaraz   YOB: 1990  Date of Visit: 03/13/2024    To Whom It May Concern:    Mikey Alcaraz  was at [facility] on 03/13/2024. The patient may return to work/school on MAR 14 2024 with no restrictions. If you have any questions or concerns, or if I can be of further assistance, please do not hesitate to contact me.    Sincerely,    KIKO VALDEZ MD

## 2024-03-13 NOTE — PROGRESS NOTES
"Subjective:       Patient ID: Aliya Alcaraz is a 34 y.o. female.    Vitals:  height is 5' 1" (1.549 m) and weight is 62.1 kg (137 lb). Her oral temperature is 98.7 °F (37.1 °C). Her blood pressure is 111/75 and her pulse is 71. Her respiration is 18 and oxygen saturation is 99%.     Chief Complaint: URI (Cough , nasal congestion started 1 week ago was seen at another urgent care clinic was prescribed amoxicillin and antihistamine nasal spray, currently has 2 days left of Abx. Pt currently states has liquid yellow discharge from nose and post nasal drip.)    Patient with recent sinusitis, H flu on PCR testing.  Nearly finished a course of Amoxil.  Had some yellow thin drainage from the nasal passages, she collected that and took a picture of it.  She is concerned.  Has had previous sinus surgery.  Overall symptoms are nearly resolved, no sinus pressure, no fever, no sore throat, minimal cough.    All other systems are negative    Chart reviewed    Objective:   Physical Exam   Constitutional: She appears well-developed.  Non-toxic appearance. She does not appear ill. No distress.   HENT:   Head: Atraumatic.   Nose: No purulent discharge. Right sinus exhibits no maxillary sinus tenderness and no frontal sinus tenderness. Left sinus exhibits no maxillary sinus tenderness and no frontal sinus tenderness.   Mouth/Throat: Uvula is midline.   Eyes: Right eye exhibits no discharge. Left eye exhibits no discharge. Extraocular movement intact   Neck: Neck supple. No neck rigidity present.   Cardiovascular: Regular rhythm.   Pulmonary/Chest: Effort normal and breath sounds normal. No respiratory distress. She has no wheezes. She has no rales.   Lymphadenopathy:     She has no cervical adenopathy.   Neurological: She is alert.   Skin: Skin is warm, dry and not diaphoretic.   Psychiatric: Her behavior is normal.   Nursing note and vitals reviewed.        Assessment:     1. Sinusitis, unspecified chronicity, unspecified location  "           Plan:   We had a lengthy discussion about potential origins of the photo she showed me.  There is clear yellow fluid that she collected from the nasal passages.  This could certainly just be related to resolving sinusitis, but we also briefly discussed CSF leak.  She is having no neuro symptoms, no systemic symptoms, no fever.  I encouraged her to contact her ENT to discuss further.  We reviewed ER precautions.  She will finish Augmentin.      Sinusitis, unspecified chronicity, unspecified location        Please note: This chart was completed via voice to text dictation. It may contain typographical/word recognition errors. If there are any questions, please contact the provider for final clarification.

## 2024-03-19 DIAGNOSIS — J32.9 CHRONIC SINUSITIS, UNSPECIFIED LOCATION: Primary | ICD-10-CM

## 2025-01-09 ENCOUNTER — OFFICE VISIT (OUTPATIENT)
Dept: URGENT CARE | Facility: CLINIC | Age: 35
End: 2025-01-09
Payer: COMMERCIAL

## 2025-01-09 VITALS
TEMPERATURE: 98 F | DIASTOLIC BLOOD PRESSURE: 84 MMHG | HEIGHT: 61 IN | SYSTOLIC BLOOD PRESSURE: 125 MMHG | HEART RATE: 72 BPM | BODY MASS INDEX: 28.63 KG/M2 | WEIGHT: 151.63 LBS | RESPIRATION RATE: 18 BRPM | OXYGEN SATURATION: 99 %

## 2025-01-09 DIAGNOSIS — R52 BODY ACHES: ICD-10-CM

## 2025-01-09 DIAGNOSIS — J06.9 VIRAL URI WITH COUGH: Primary | ICD-10-CM

## 2025-01-09 DIAGNOSIS — R05.9 COUGH, UNSPECIFIED TYPE: ICD-10-CM

## 2025-01-09 LAB
CTP QC/QA: YES
CTP QC/QA: YES
POC MOLECULAR INFLUENZA A AGN: NEGATIVE
POC MOLECULAR INFLUENZA B AGN: NEGATIVE
SARS-COV-2 AG RESP QL IA.RAPID: NEGATIVE

## 2025-01-09 PROCEDURE — 99213 OFFICE O/P EST LOW 20 MIN: CPT | Mod: S$PBB,,, | Performed by: NURSE PRACTITIONER

## 2025-01-09 PROCEDURE — 87502 INFLUENZA DNA AMP PROBE: CPT | Mod: PBBFAC | Performed by: NURSE PRACTITIONER

## 2025-01-09 PROCEDURE — 87811 SARS-COV-2 COVID19 W/OPTIC: CPT | Mod: PBBFAC | Performed by: NURSE PRACTITIONER

## 2025-01-09 PROCEDURE — 99215 OFFICE O/P EST HI 40 MIN: CPT | Mod: PBBFAC | Performed by: NURSE PRACTITIONER

## 2025-01-09 RX ORDER — PROMETHAZINE HYDROCHLORIDE AND DEXTROMETHORPHAN HYDROBROMIDE 6.25; 15 MG/5ML; MG/5ML
10 SYRUP ORAL
Qty: 120 ML | Refills: 0 | Status: SHIPPED | OUTPATIENT
Start: 2025-01-09

## 2025-01-09 RX ORDER — BENZONATATE 200 MG/1
200 CAPSULE ORAL 3 TIMES DAILY PRN
Qty: 30 CAPSULE | Refills: 0 | Status: SHIPPED | OUTPATIENT
Start: 2025-01-09

## 2025-01-09 RX ORDER — ALBUTEROL SULFATE 90 UG/1
2 INHALANT RESPIRATORY (INHALATION) EVERY 6 HOURS PRN
Qty: 1 G | Refills: 0 | Status: SHIPPED | OUTPATIENT
Start: 2025-01-09

## 2025-01-10 NOTE — PROGRESS NOTES
"Subjective:      Patient ID: Aliya Alcaraz is a 34 y.o. female.    Vitals:  height is 5' 1" (1.549 m) and weight is 68.8 kg (151 lb 9.6 oz). Her oral temperature is 98.2 °F (36.8 °C). Her blood pressure is 125/84 and her pulse is 72. Her respiration is 18 and oxygen saturation is 99%.     Chief Complaint: Cough (Chest tightness, sore throat, body aches x 4 days, pt has been taking mucinex with no relief)    Cough     As stated in CC. Pt denies taking medication for cough. With cough there is a  discomfort in chest. Pt reports it has "icy feeling in chest when I cough". Pt denies shortness for breath, fever, weakness, headache, dizziness stomach pain, nausea or vomiting    Respiratory:  Positive for cough.    Skin:  Negative for erythema.      Objective:     Physical Exam   Constitutional: She is oriented to person, place, and time. She appears well-developed. She is cooperative.  Non-toxic appearance. She does not appear ill. No distress. awake  HENT:   Head: Normocephalic and atraumatic.   Ears:   Right Ear: Hearing normal. No tenderness. Tympanic membrane is not injected, not erythematous and not bulging. No middle ear effusion.   Left Ear: Hearing normal. No tenderness. Tympanic membrane is not injected, not erythematous and not bulging.  No middle ear effusion.   Nose: Congestion present. No rhinorrhea or purulent discharge. Right sinus exhibits no maxillary sinus tenderness and no frontal sinus tenderness. Left sinus exhibits no maxillary sinus tenderness and no frontal sinus tenderness.   Mouth/Throat: Uvula is midline. Mucous membranes are moist. No oropharyngeal exudate or posterior oropharyngeal erythema.   Eyes: Conjunctivae are normal. Right eye exhibits no discharge. Left eye exhibits no discharge. Extraocular movement intact   Neck: Neck supple. No neck rigidity present.   Cardiovascular: Normal rate, regular rhythm and normal pulses.   Pulmonary/Chest: Effort normal and breath sounds normal. No stridor. " No respiratory distress. She has no wheezes. She has no rhonchi. She has no rales. She exhibits no tenderness.   Abdominal: Normal appearance. She exhibits no distension. Soft.   Musculoskeletal: Normal range of motion.         General: Normal range of motion.      Right lower leg: No edema.      Left lower leg: No edema.   Lymphadenopathy:     She has no cervical adenopathy.   Neurological: no focal deficit. She is alert and oriented to person, place, and time.   Skin: Skin is warm, dry, not diaphoretic and no rash. Capillary refill takes less than 2 seconds. No erythema   Psychiatric: Her behavior is normal. Mood, judgment and thought content normal.   Nursing note and vitals reviewed.      Assessment:     1. Viral URI with cough    2. Cough, unspecified type    3. Body aches      Results for orders placed or performed in visit on 01/09/25   SARS Coronavirus 2 Antigen, POCT Manual Read    Collection Time: 01/09/25  7:11 PM   Result Value Ref Range    SARS Coronavirus 2 Antigen Negative Negative     Acceptable Yes    POCT Influenza A/B MOLECULAR    Collection Time: 01/09/25  7:18 PM   Result Value Ref Range    POC Molecular Influenza A Ag Negative Negative    POC Molecular Influenza B Ag Negative Negative     Acceptable Yes          Plan:   ER precautions given  COVID test is negative today in clinic.  Return to clinic or follow up with PCP  if symptoms persist greater than 7 days.  Also discussed with patient, symptoms may be related to URI.  Encouraged to remain hydrated, avoid any known allergy triggers or irritants, may take Tylenol or Motrin as needed for discomfort if not contraindicated.    - warm salt water gargles to your throat  - OTC cold/flu products as desired for symptoms  - Plenty of fluids  - Humidified air    Viral URI with cough  -     albuterol (PROVENTIL HFA) 90 mcg/actuation inhaler; Inhale 2 puffs into the lungs every 6 (six) hours as needed for Wheezing or  Shortness of Breath. Rescue  Dispense: 1 g; Refill: 0  -     promethazine-dextromethorphan (PROMETHAZINE-DM) 6.25-15 mg/5 mL Syrp; Take 10 mLs by mouth every 6 to 8 hours as needed (Cough). May cause sedation.  Do not drive or operate heavy machinery after taking this medication.  Dispense: 120 mL; Refill: 0  -     benzonatate (TESSALON) 200 MG capsule; Take 1 capsule (200 mg total) by mouth 3 (three) times daily as needed for Cough.  Dispense: 30 capsule; Refill: 0    Cough, unspecified type  -     POCT Influenza A/B MOLECULAR  -     SARS Coronavirus 2 Antigen, POCT Manual Read  -     albuterol (PROVENTIL HFA) 90 mcg/actuation inhaler; Inhale 2 puffs into the lungs every 6 (six) hours as needed for Wheezing or Shortness of Breath. Rescue  Dispense: 1 g; Refill: 0  -     promethazine-dextromethorphan (PROMETHAZINE-DM) 6.25-15 mg/5 mL Syrp; Take 10 mLs by mouth every 6 to 8 hours as needed (Cough). May cause sedation.  Do not drive or operate heavy machinery after taking this medication.  Dispense: 120 mL; Refill: 0  -     benzonatate (TESSALON) 200 MG capsule; Take 1 capsule (200 mg total) by mouth 3 (three) times daily as needed for Cough.  Dispense: 30 capsule; Refill: 0    Body aches  -     POCT Influenza A/B MOLECULAR  -     SARS Coronavirus 2 Antigen, POCT Manual Read

## 2025-01-10 NOTE — PATIENT INSTRUCTIONS
Please follow instructions on patient education material.      Return to urgent care in 2 to 3 days if symptoms are not improving, immediately if you develop any new or worsening symptoms.   - warm salt water gargles to your throat  - OTC cold/flu products as desired for symptoms  - Plenty of fluids  - Humidified air  - Tylenol or Motrin for pain/fever    Go to the ER if you experience chest pain with shortness of breath, shortness of breath when moving around your house, high fevers 103.0+, excessive vomiting/diarrhea, or general distress.

## 2025-01-13 ENCOUNTER — OFFICE VISIT (OUTPATIENT)
Dept: URGENT CARE | Facility: CLINIC | Age: 35
End: 2025-01-13
Payer: COMMERCIAL

## 2025-01-13 VITALS
BODY MASS INDEX: 28.51 KG/M2 | SYSTOLIC BLOOD PRESSURE: 109 MMHG | TEMPERATURE: 99 F | HEART RATE: 106 BPM | RESPIRATION RATE: 18 BRPM | WEIGHT: 151 LBS | DIASTOLIC BLOOD PRESSURE: 77 MMHG | HEIGHT: 61 IN | OXYGEN SATURATION: 98 %

## 2025-01-13 DIAGNOSIS — J06.9 UPPER RESPIRATORY TRACT INFECTION, UNSPECIFIED TYPE: Primary | ICD-10-CM

## 2025-01-13 DIAGNOSIS — J02.9 SORE THROAT: ICD-10-CM

## 2025-01-13 LAB
CTP QC/QA: YES
CTP QC/QA: YES
MOLECULAR STREP A: NEGATIVE
SARS-COV-2 AG RESP QL IA.RAPID: NEGATIVE

## 2025-01-13 PROCEDURE — 87811 SARS-COV-2 COVID19 W/OPTIC: CPT | Mod: PBBFAC

## 2025-01-13 PROCEDURE — 99213 OFFICE O/P EST LOW 20 MIN: CPT | Mod: PBBFAC

## 2025-01-13 PROCEDURE — 99214 OFFICE O/P EST MOD 30 MIN: CPT | Mod: S$PBB,,,

## 2025-01-13 PROCEDURE — 63600175 PHARM REV CODE 636 W HCPCS

## 2025-01-13 PROCEDURE — 87651 STREP A DNA AMP PROBE: CPT | Mod: PBBFAC

## 2025-01-13 RX ORDER — DEXBROMPHENIRAMINE MALEATE, PHENYLEPHRINE HYDROCHLORIDE 2; 7.5 MG/1; MG/1
2-7.5 TABLET ORAL 2 TIMES DAILY
Qty: 14 TABLET | Refills: 0 | Status: SHIPPED | OUTPATIENT
Start: 2025-01-13 | End: 2025-01-20

## 2025-01-13 RX ORDER — DEXAMETHASONE SODIUM PHOSPHATE 4 MG/ML
4 INJECTION, SOLUTION INTRA-ARTICULAR; INTRALESIONAL; INTRAMUSCULAR; INTRAVENOUS; SOFT TISSUE
Status: COMPLETED | OUTPATIENT
Start: 2025-01-13 | End: 2025-01-13

## 2025-01-13 RX ADMIN — DEXAMETHASONE SODIUM PHOSPHATE 4 MG: 4 INJECTION, SOLUTION INTRAMUSCULAR; INTRAVENOUS at 03:01

## 2025-01-13 NOTE — LETTER
January 13, 2025      [facility]  [facility] [facility] [address]  Phone: [phone]       Patient: Aliya Alcaraz   YOB: 1990  Date of Visit: 01/13/2025    To Whom It May Concern:    Mikey Alcaraz  was at [facility] on 01/13/2025. The patient may return to work/school on 1/15/2025 with no restrictions. If you have any questions or concerns, or if I can be of further assistance, please do not hesitate to contact me.    Sincerely,    HOLLIE Marcano

## 2025-01-13 NOTE — PROGRESS NOTES
"Subjective:       Patient ID: Aliya Alcaraz is a 34 y.o. female.    Vitals:  height is 5' 1" (1.549 m) and weight is 68.5 kg (151 lb). Her oral temperature is 98.7 °F (37.1 °C). Her blood pressure is 109/77 and her pulse is 106. Her respiration is 18 and oxygen saturation is 98%.     Chief Complaint: Cough (Return visit. Cough, congestion, wheezing, sore throat, loss of taste and smell x 1 week Pt requesting to be reswabbed for Covid /)    34-year-old female reports to the clinic for complaints of cough, congestion, wheezing, sore throat and loss of taste and smell for the last 4 days.  Patient was seen in the clinic 4 days ago and tested negative for COVID and is asking to be re swab for COVID today.  Patient states she is also having body aches and extreme fatigue.  Patient denies fever, nausea, vomiting, abdominal pain, diarrhea, headache, body aches.  Patient states she has been taking promethazine DM, Tessalon Perles, and Delsym for cough with little relief.    All other systems are negative    Chart reviewed    Objective:   Physical Exam   Constitutional: She appears well-developed.  Non-toxic appearance. She does not appear ill. No distress.   HENT:   Head: Normocephalic and atraumatic.   Ears:   Right Ear: Hearing, tympanic membrane, external ear and ear canal normal.   Left Ear: Hearing, tympanic membrane, external ear and ear canal normal.   Nose: Mucosal edema and rhinorrhea present. No purulent discharge. Right sinus exhibits no maxillary sinus tenderness and no frontal sinus tenderness. Left sinus exhibits no maxillary sinus tenderness and no frontal sinus tenderness.   Mouth/Throat: Uvula is midline. Oropharyngeal exudate, posterior oropharyngeal edema and posterior oropharyngeal erythema present.   Eyes: Right eye exhibits no discharge. Left eye exhibits no discharge. Extraocular movement intact   Neck: Neck supple. No neck rigidity present.   Cardiovascular: Regular rhythm.   Pulmonary/Chest: Effort " normal and breath sounds normal. No respiratory distress. She has no wheezes. She has no rhonchi. She has no rales.   Lymphadenopathy:     She has no cervical adenopathy.   Neurological: She is alert.   Skin: Skin is warm, dry and not diaphoretic.   Psychiatric: Her behavior is normal.   Nursing note and vitals reviewed.      Assessment:     1. Upper respiratory tract infection, unspecified type    2. Sore throat          Results for orders placed or performed in visit on 01/13/25   POCT Strep A, Molecular    Collection Time: 01/13/25  3:35 PM   Result Value Ref Range    Molecular Strep A, POC Negative Negative     Acceptable Yes    SARS Coronavirus 2 Antigen, POCT Manual Read    Collection Time: 01/13/25  3:35 PM   Result Value Ref Range    SARS Coronavirus 2 Antigen Negative Negative     Acceptable Yes        Plan:     If a test result is still pending, we will notify you if it is positive.  However, you can see all of your results on your patient portal.    Please drink plenty of fluids.  Please get plenty of rest.  Please return here or go to the Emergency Department for any concerns or worsening of condition.  If you were prescribed antibiotics, please take them to completion.  If you were given wait & see antibiotics, please wait 4-7 days before taking them, and only take them if your symptoms have not improved, or your symptoms have worsened.  If you do begin taking the antibiotics, please take them to completion.  If you were prescribed a narcotic medication, do not drive or operate heavy equipment or machinery while taking these medications.  If you were given a steroid shot in the clinic and have also been given a prescription for a steroid such as Prednisone or a Medrol Dose Pack, please begin taking them tomorrow.  If you do not have Hypertension or any history of palpitations, it is ok to take over the counter Sudafed or Mucinex D or Allegra-D or Claritin-D or Zyrtec-D.  If  you do take one of the above, it is ok to combine that with plain over the counter Mucinex or Allegra or Claritin or Zyrtec.  If for example you are taking Zyrtec -D, you can combine that with Mucinex, but not Mucinex-D.  If you are taking Mucinex-D, you can combine that with plain Allegra or Claritin or Zyrtec.   If you do have Hypertension or palpitations, it is safe to take Coricidin HBP for relief of sinus symptoms.  If not allergic and not contraindicated because of your medical conditions, please take over the counter Tylenol (Acetaminophen) and/or Motrin (Ibuprofen) as directed for control of pain and/or fever.  Please follow up with your primary care doctor or specialist as needed.  If you  smoke, please stop smoking.      Upper respiratory tract infection, unspecified type  -     dexAMETHasone injection 4 mg  -     dexbrompheniramine-phenyleph (ALAHIST PE) 2-7.5 mg Tab; Take 2-7.5 mg by mouth 2 (two) times a day. for 7 days  Dispense: 14 tablet; Refill: 0    Sore throat  -     POCT Strep A, Molecular  -     SARS Coronavirus 2 Antigen, POCT Manual Read        Please note: This chart was completed via voice to text dictation. It may contain typographical/word recognition errors. If there are any questions, please contact the provider for final clarification.

## 2025-01-23 ENCOUNTER — HOSPITAL ENCOUNTER (EMERGENCY)
Facility: HOSPITAL | Age: 35
Discharge: HOME | End: 2025-01-23
Attending: EMERGENCY MEDICINE
Payer: COMMERCIAL

## 2025-01-23 VITALS
TEMPERATURE: 98 F | OXYGEN SATURATION: 100 % | BODY MASS INDEX: 27.5 KG/M2 | WEIGHT: 145.63 LBS | RESPIRATION RATE: 18 BRPM | HEART RATE: 63 BPM | HEIGHT: 61 IN | DIASTOLIC BLOOD PRESSURE: 74 MMHG | SYSTOLIC BLOOD PRESSURE: 100 MMHG

## 2025-01-23 DIAGNOSIS — S20.212A CONTUSION OF LEFT CHEST WALL, INITIAL ENCOUNTER: Primary | ICD-10-CM

## 2025-01-23 LAB
B-HCG UR QL: NEGATIVE
CTP QC/QA: YES

## 2025-01-23 PROCEDURE — 99283 EMERGENCY DEPT VISIT LOW MDM: CPT | Mod: 25

## 2025-01-23 PROCEDURE — 81025 URINE PREGNANCY TEST: CPT | Performed by: EMERGENCY MEDICINE

## 2025-01-23 RX ORDER — MELOXICAM 7.5 MG/1
7.5 TABLET ORAL 2 TIMES DAILY
Qty: 10 TABLET | Refills: 0 | Status: SHIPPED | OUTPATIENT
Start: 2025-01-23

## 2025-01-23 NOTE — ED PROVIDER NOTES
Encounter Date: 1/23/2025       History     Chief Complaint   Patient presents with    Chest Pain    Fall     Pt arrives with c/o chest pain after she slipped on the ice and fell onto a tree stump with her chest      Patient presents emergency department after slipping while walking her dog falling on the ground and hitting a tree stump on her left upper chest wall.  She did not lose consciousness but does have tenderness that area.  She takes Paxil no other medications.  No other systemic complaints to bilateral upper or lower extremities      Review of patient's allergies indicates:   Allergen Reactions    Erythromycin-sulfisoxazole Rash     Past Medical History:   Diagnosis Date    Anxiety      Past Surgical History:   Procedure Laterality Date    ADENOIDECTOMY      TONSILLECTOMY      TYMPANOSTOMY TUBE PLACEMENT       Family History   Family history unknown: Yes     Social History     Tobacco Use    Smoking status: Never    Smokeless tobacco: Never   Substance Use Topics    Alcohol use: Yes     Comment: monthly    Drug use: Never     Review of Systems   Cardiovascular:  Positive for chest pain.   All other systems reviewed and are negative.      Physical Exam     Initial Vitals [01/23/25 1546]   BP Pulse Resp Temp SpO2   110/75 80 18 97.8 °F (36.6 °C) 98 %      MAP       --         Physical Exam    Constitutional: She appears well-developed and well-nourished. No distress.   HENT:   Head: Normocephalic and atraumatic.   Eyes: EOM are normal. Pupils are equal, round, and reactive to light.   Neck:   Normal range of motion.  Cardiovascular:  Normal rate and regular rhythm.           Pulmonary/Chest: No stridor. No respiratory distress. She has no wheezes.   Abdominal: Abdomen is soft. Bowel sounds are normal. She exhibits no distension.   Musculoskeletal:         General: Normal range of motion.      Cervical back: Normal range of motion.      Comments: With the patient's left arm held outward from body patient is  able to resist any type of active motion when I press up down left right arm without any pain elicited.  No crepitus shoulder no bruising.  Left-sided chest wall minor tenderness to lateral upper chest wall with no clavicle tenderness no erythema no bruising     Psychiatric: She has a normal mood and affect.         ED Course   Procedures  Labs Reviewed   POCT URINE PREGNANCY       Result Value    POC Preg Test, Ur Negative       Acceptable Yes            Imaging Results              X-Ray Chest 1 View (Final result)  Result time 01/23/25 17:32:11      Final result by Vane Zuleta MD (01/23/25 17:32:11)                   Impression:      No abnormality seen      Electronically signed by: Wiliam Zuleta  Date:    01/23/2025  Time:    17:32               Narrative:    EXAMINATION:  XR CHEST 1 VIEW    CLINICAL HISTORY:  Fall on tree stump, pain in upper RAFAEL chest wall;    TECHNIQUE:  Single frontal view of the chest was performed.    COMPARISON:  12/26/2023    FINDINGS:  There are chronic appearing lung changes seen bilaterally. No evidence of acute infiltrate is seen. No mass is seen. No lesion is seen. No pleural effusion is seen. The heart appears normal. The aorta appears normal. The bones and joints show no acute abnormality.                                       Medications - No data to display  Medical Decision Making  Amount and/or Complexity of Data Reviewed  Labs: ordered.  Radiology: ordered.                          Medical Decision Making:   Initial Assessment:   Chest x-ray in progress fairly benign exam only mild tenderness with no obvious deformity or bruising of chest wall with stable shoulder joint on left.  No other systemic complaints per patient.  Good breath sounds bilaterally  Differential Diagnosis:   Contusion, rib fracture, pneumothorax, strain, sprain  ED Management:  No evidence of any fracture will be discharged with Mobic return precautions provided              Clinical Impression:  Final diagnoses:  [S20.212A] Contusion of left chest wall, initial encounter (Primary)          ED Disposition Condition    Discharge Stable          ED Prescriptions       Medication Sig Dispense Start Date End Date Auth. Provider    meloxicam (MOBIC) 7.5 MG tablet Take 1 tablet (7.5 mg total) by mouth 2 (two) times a day. 10 tablet 1/23/2025 -- Carlitos Galdamez MD          Follow-up Information    None          Carlitos Galdamez MD  01/23/25 1911

## 2025-04-23 ENCOUNTER — OFFICE VISIT (OUTPATIENT)
Dept: URGENT CARE | Facility: CLINIC | Age: 35
End: 2025-04-23
Payer: COMMERCIAL

## 2025-04-23 VITALS
OXYGEN SATURATION: 98 % | BODY MASS INDEX: 29.08 KG/M2 | HEART RATE: 90 BPM | WEIGHT: 148.13 LBS | RESPIRATION RATE: 18 BRPM | HEIGHT: 60 IN | DIASTOLIC BLOOD PRESSURE: 75 MMHG | TEMPERATURE: 99 F | SYSTOLIC BLOOD PRESSURE: 109 MMHG

## 2025-04-23 DIAGNOSIS — Z20.822 COVID-19 RULED OUT BY CLINICAL CRITERIA: ICD-10-CM

## 2025-04-23 DIAGNOSIS — R09.89 SYMPTOMS OF UPPER RESPIRATORY INFECTION (URI): Primary | ICD-10-CM

## 2025-04-23 LAB
CTP QC/QA: YES
FLUAV AG UPPER RESP QL IA.RAPID: NOT DETECTED
FLUBV AG UPPER RESP QL IA.RAPID: NOT DETECTED
MOLECULAR STREP A: NEGATIVE
SARS-COV-2 RNA RESP QL NAA+PROBE: NOT DETECTED

## 2025-04-23 PROCEDURE — 0240U COVID/FLU A&B PCR: CPT

## 2025-04-23 PROCEDURE — 87651 STREP A DNA AMP PROBE: CPT | Mod: PBBFAC

## 2025-04-23 PROCEDURE — 99213 OFFICE O/P EST LOW 20 MIN: CPT | Mod: S$PBB,,,

## 2025-04-23 PROCEDURE — 99215 OFFICE O/P EST HI 40 MIN: CPT | Mod: PBBFAC

## 2025-04-23 RX ORDER — IBUPROFEN 600 MG/1
600 TABLET ORAL EVERY 6 HOURS PRN
Qty: 30 TABLET | Refills: 1 | Status: SHIPPED | OUTPATIENT
Start: 2025-04-23

## 2025-04-23 RX ORDER — CHLOPHEDIANOL HCL AND PYRILAMINE MALEATE 12.5; 12.5 MG/5ML; MG/5ML
10 SOLUTION ORAL 3 TIMES DAILY PRN
Qty: 180 ML | Refills: 0 | Status: SHIPPED | OUTPATIENT
Start: 2025-04-23

## 2025-04-23 NOTE — PROGRESS NOTES
Subjective:      Patient ID: Aliya Alcaraz is a 35 y.o. female.    Vitals:  height is 5' (1.524 m) and weight is 67.2 kg (148 lb 2.4 oz). Her temperature is 98.6 °F (37 °C). Her blood pressure is 109/75 and her pulse is 90. Her respiration is 18 and oxygen saturation is 98%.     Chief Complaint: Sore Throat (2days) and Otalgia (RT X 2days)    Presents to the clinic with sore throat, right ear pain, low-grade fever (99.9F), and cough.  She is taking Chloraseptic, Delsym cough medication, and Tylenol, mild relief.  Cough is worse at night.    URI   This is a new problem. The current episode started acute onset. The problem has been unchanged. There has been no fever. The cough is Productive of sputum. Associated symptoms include congestion, coughing, ear pain and a sore throat. Pertinent negatives include no chest pain, nausea, rhinorrhea or vomiting. She has tried acetaminophen for the symptoms. The treatment provided mild relief.       HENT:  Positive for ear pain, congestion, postnasal drip and sore throat.    Cardiovascular: Negative.  Negative for chest pain.   Respiratory:  Positive for cough. Negative for chest tightness and shortness of breath.    Gastrointestinal: Negative.  Negative for nausea and vomiting.   Skin: Negative.    Allergic/Immunologic: Positive for seasonal allergies.   Neurological: Negative.       Objective:     Physical Exam   Constitutional: She is oriented to person, place, and time. She appears well-developed. She is cooperative.  Non-toxic appearance. She appears ill. No distress. normalawake  HENT:   Head: Normocephalic and atraumatic.   Ears:   Right Ear: Hearing, external ear and ear canal normal. A middle ear effusion is present.   Left Ear: Hearing, external ear and ear canal normal. A middle ear effusion is present.   Nose: Mucosal edema and congestion present. No rhinorrhea or nasal deformity. No epistaxis. Right sinus exhibits no maxillary sinus tenderness and no frontal sinus  tenderness. Left sinus exhibits no maxillary sinus tenderness and no frontal sinus tenderness.   Mouth/Throat: Uvula is midline, oropharynx is clear and moist and mucous membranes are normal. Mucous membranes are moist. No trismus in the jaw. Normal dentition. No uvula swelling. No oropharyngeal exudate, posterior oropharyngeal edema or posterior oropharyngeal erythema. Oropharynx is clear.   Mild nasal turbinate, postnasal drip      Comments: Mild nasal turbinate, postnasal drip  Eyes: Conjunctivae and lids are normal. No scleral icterus. vision grossly intact periorbital hyperpigmentation   Neck: Trachea normal and phonation normal. Neck supple. No edema present. No erythema present. No neck rigidity present.   Cardiovascular: Normal rate, regular rhythm, normal heart sounds and normal pulses.   Pulmonary/Chest: Effort normal and breath sounds normal. No respiratory distress. She has no decreased breath sounds. She has no wheezes. She has no rhonchi.   Abdominal: Normal appearance.   Musculoskeletal: Normal range of motion.         General: No deformity. Normal range of motion.   Lymphadenopathy:     She has no cervical adenopathy.   Neurological: no focal deficit. She is alert and oriented to person, place, and time. She exhibits normal muscle tone. Coordination normal.   Skin: Skin is warm, dry, intact, not diaphoretic and not pale.   Psychiatric: Her speech is normal and behavior is normal. Mood normal.   Nursing note and vitals reviewed.      Assessment:     1. Symptoms of upper respiratory infection (URI)    2. COVID-19 ruled out by clinical criteria      Results for orders placed or performed in visit on 04/23/25   POCT Strep A, Molecular    Collection Time: 04/23/25  9:31 AM   Result Value Ref Range    Molecular Strep A, POC Negative Negative     Acceptable Yes          Plan:       Symptoms of upper respiratory infection (URI)  -     POCT Strep A, Molecular  -     ibuprofen (ADVIL,MOTRIN) 600  MG tablet; Take 1 tablet (600 mg total) by mouth every 6 (six) hours as needed for Pain or Temperature greater than (100.4). Take with food  Dispense: 30 tablet; Refill: 1  -     pyrilamine-chlophedianoL (NINJACOF) 12.5-12.5 mg/5 mL Liqd; Take 10 mLs by mouth 3 (three) times daily as needed (cough).  Dispense: 180 mL; Refill: 0    COVID-19 ruled out by clinical criteria  -     COVID/FLU A&B PCR    An upper respiratory infection is also called a cold. It can affect your nose, throat, ears, and sinuses. Cold symptoms are usually worst for the first 3 to 5 days. Most people get better in 7 to 14 days. You may continue to cough for 2 to 3 weeks. Colds are caused by viruses and do not get better with antibiotics . Rest. Drink plenty of fluids. Tylenol or ibuprofen for any fever or aches. Cough medication may causes some drowsiness, no driving when taking.  Continue taking Flonase and Singulair for nasal congestion and sinus symptoms.  COVID tests pending. The clinic will call with positive results.  Return for any persistence, new, or worsening symptoms.  ER precautions provided.

## 2025-04-23 NOTE — PATIENT INSTRUCTIONS
An upper respiratory infection is also called a cold. It can affect your nose, throat, ears, and sinuses. Cold symptoms are usually worst for the first 3 to 5 days. Most people get better in 7 to 14 days. You may continue to cough for 2 to 3 weeks. Colds are caused by viruses and do not get better with antibiotics . Rest. Drink plenty of fluids. Tylenol or ibuprofen for any fever or aches. Cough medication may causes some drowsiness, no driving when taking.  Continue taking Flonase and Singulair for nasal congestion and sinus symptoms.  COVID tests pending. The clinic will call with positive results.  Return for any persistence, new, or worsening symptoms.

## 2025-04-23 NOTE — LETTER
April 23, 2025      [facility]  [facility] access [facility] [facility] [address]  Phone: [phone]       Patient: Aliya Alcaraz   YOB: 1990  Date of Visit: 04/23/2025    To Whom It May Concern:    Mikey Alcaraz  was at [facility] on 04/23/2025. The patient may return to work/school on APR 25 2025 with no restrictions. If you have any questions or concerns, or if I can be of further assistance, please do not hesitate to contact me.    Sincerely,    MCKENNA KUMARI NP

## 2025-04-27 ENCOUNTER — OFFICE VISIT (OUTPATIENT)
Dept: URGENT CARE | Facility: CLINIC | Age: 35
End: 2025-04-27
Payer: COMMERCIAL

## 2025-04-27 VITALS
DIASTOLIC BLOOD PRESSURE: 77 MMHG | HEART RATE: 70 BPM | BODY MASS INDEX: 29.06 KG/M2 | RESPIRATION RATE: 16 BRPM | TEMPERATURE: 98 F | HEIGHT: 60 IN | SYSTOLIC BLOOD PRESSURE: 110 MMHG | OXYGEN SATURATION: 100 % | WEIGHT: 148 LBS

## 2025-04-27 DIAGNOSIS — J30.2 SEASONAL ALLERGIES: ICD-10-CM

## 2025-04-27 DIAGNOSIS — R05.9 COUGH, UNSPECIFIED TYPE: ICD-10-CM

## 2025-04-27 DIAGNOSIS — J06.9 VIRAL URI WITH COUGH: Primary | ICD-10-CM

## 2025-04-27 LAB
CTP QC/QA: YES
MOLECULAR STREP A: NEGATIVE

## 2025-04-27 PROCEDURE — 87651 STREP A DNA AMP PROBE: CPT | Mod: PBBFAC | Performed by: NURSE PRACTITIONER

## 2025-04-27 PROCEDURE — 99214 OFFICE O/P EST MOD 30 MIN: CPT | Mod: PBBFAC | Performed by: NURSE PRACTITIONER

## 2025-04-27 PROCEDURE — 99213 OFFICE O/P EST LOW 20 MIN: CPT | Mod: S$PBB,,, | Performed by: NURSE PRACTITIONER

## 2025-04-27 RX ORDER — MONTELUKAST SODIUM 10 MG/1
10 TABLET ORAL NIGHTLY
Qty: 30 TABLET | Refills: 0 | Status: SHIPPED | OUTPATIENT
Start: 2025-04-27

## 2025-04-27 RX ORDER — FLUTICASONE PROPIONATE 50 MCG
2 SPRAY, SUSPENSION (ML) NASAL DAILY
Qty: 15.8 ML | Refills: 0 | Status: SHIPPED | OUTPATIENT
Start: 2025-04-27

## 2025-04-27 NOTE — PATIENT INSTRUCTIONS
-Flonase and Singulair refilled.   -Continue albuterol as needed.   -Warm salt water gargles.   -Continue Ninjacof as prescribed.   -Stay well hydrated and get plenty of sleep.   -Return to urgent care for worsening of symptoms.

## 2025-04-27 NOTE — PROGRESS NOTES
Subjective:      Patient ID: Aliya Alcaraz is a 35 y.o. female.    Vitals:  height is 5' (1.524 m) and weight is 67.1 kg (148 lb). Her temperature is 98.3 °F (36.8 °C). Her blood pressure is 110/77 and her pulse is 70. Her respiration is 16 and oxygen saturation is 100%.     Chief Complaint: Cough (Pt c/o cough , mucus not improving , SOB , sore throat )    35 year old female who presents to [facility]  with complaints cough, sore throat and mucus production has not improved. She is doing sinus rinses 3-4 times daily. She is taking Ninjacough morning and night which is helping with the cough and she is now able to rest. She is doing warm salt water gargles which has helped her thought.She is out of the Singulair and is needing a refill on Flonase. She has a trip next weekend.     Cough  Associated symptoms include a sore throat. Pertinent negatives include no ear pain, fever, headaches, rash, shortness of breath or wheezing.       Constitution: Negative for activity change, appetite change and fever.   HENT:  Positive for congestion and sore throat. Negative for ear pain.    Eyes:  Negative for eye discharge.   Respiratory:  Positive for cough. Negative for shortness of breath and wheezing.    Gastrointestinal:  Negative for abdominal pain, nausea, vomiting, constipation and diarrhea.   Genitourinary:  Negative for dysuria, frequency, urgency and urine decreased.   Musculoskeletal:  Negative for pain.   Skin:  Negative for rash.   Neurological:  Negative for dizziness and headaches.      Objective:     Physical Exam   Constitutional: She is oriented to person, place, and time.  Non-toxic appearance. No distress.   HENT:   Head: Normocephalic and atraumatic.   Ears:   Right Ear: Tympanic membrane and ear canal normal.   Left Ear: Tympanic membrane and ear canal normal.   Nose: Rhinorrhea (thin, clear) and congestion (mild, turbinates pale and swollen) present.   Mouth/Throat: Mucous membranes are moist. Cobblestoning  present. No oropharyngeal exudate or posterior oropharyngeal erythema. Oropharynx is clear.   Eyes: Pupils are equal, round, and reactive to light. Extraocular movement intact   Neck: Neck supple.   Cardiovascular: Normal rate and normal pulses.   Pulmonary/Chest: Effort normal and breath sounds normal. No respiratory distress.         Comments: +dry cough    Abdominal: Normal appearance and bowel sounds are normal. She exhibits no distension. Soft. There is no abdominal tenderness.   Musculoskeletal: Normal range of motion.         General: Normal range of motion.      Cervical back: She exhibits no tenderness.   Lymphadenopathy:     She has no cervical adenopathy.   Neurological: no focal deficit. She is alert and oriented to person, place, and time.   Skin: Skin is warm and dry. Capillary refill takes less than 2 seconds.   Nursing note and vitals reviewed.      Assessment:     1. Viral URI with cough    2. Seasonal allergies    3. Cough, unspecified type        Plan:       Viral URI with cough    Seasonal allergies  -     montelukast (SINGULAIR) 10 mg tablet; Take 1 tablet (10 mg total) by mouth every evening.  Dispense: 30 tablet; Refill: 0  -     fluticasone propionate (FLONASE) 50 mcg/actuation nasal spray; 2 sprays (100 mcg total) by Each Nostril route once daily.  Dispense: 15.8 mL; Refill: 0    Cough, unspecified type  -     POCT Strep A, Molecular    -Flonase and Singulair refilled.   -Continue albuterol as needed.   -Warm salt water gargles as needed.   -Continue Ninjacof as prescribed as needed.   -Stay well hydrated and get plenty of sleep.   -Return to urgent care for worsening of symptoms.     Results for orders placed or performed in visit on 04/27/25   POCT Strep A, Molecular    Collection Time: 04/27/25  5:38 PM   Result Value Ref Range    Molecular Strep A, POC Negative Negative     Acceptable Yes

## 2025-07-20 ENCOUNTER — OFFICE VISIT (OUTPATIENT)
Dept: URGENT CARE | Facility: CLINIC | Age: 35
End: 2025-07-20
Payer: COMMERCIAL

## 2025-07-20 VITALS
HEART RATE: 89 BPM | TEMPERATURE: 98 F | SYSTOLIC BLOOD PRESSURE: 112 MMHG | WEIGHT: 161.38 LBS | BODY MASS INDEX: 31.68 KG/M2 | RESPIRATION RATE: 20 BRPM | HEIGHT: 60 IN | DIASTOLIC BLOOD PRESSURE: 76 MMHG | OXYGEN SATURATION: 99 %

## 2025-07-20 DIAGNOSIS — L01.00 IMPETIGO: Primary | ICD-10-CM

## 2025-07-20 PROCEDURE — 99215 OFFICE O/P EST HI 40 MIN: CPT | Mod: PBBFAC | Performed by: NURSE PRACTITIONER

## 2025-07-20 PROCEDURE — 99214 OFFICE O/P EST MOD 30 MIN: CPT | Mod: S$PBB,,, | Performed by: NURSE PRACTITIONER

## 2025-07-20 RX ORDER — HYDROXYZINE HYDROCHLORIDE 25 MG/1
25 TABLET, FILM COATED ORAL 3 TIMES DAILY PRN
Qty: 12 TABLET | Refills: 0 | Status: SHIPPED | OUTPATIENT
Start: 2025-07-20 | End: 2025-07-25

## 2025-07-20 RX ORDER — CEPHALEXIN 500 MG/1
500 CAPSULE ORAL EVERY 6 HOURS
Qty: 28 CAPSULE | Refills: 0 | Status: SHIPPED | OUTPATIENT
Start: 2025-07-20 | End: 2025-07-27

## 2025-07-20 RX ORDER — MUPIROCIN 20 MG/G
OINTMENT TOPICAL 3 TIMES DAILY
Qty: 15 G | Refills: 0 | Status: SHIPPED | OUTPATIENT
Start: 2025-07-20 | End: 2025-07-27

## 2025-07-20 NOTE — PATIENT INSTRUCTIONS
Please follow instructions on patient education material.    Return to urgent care in 2 to 3 days if symptoms are not improving, immediately if you develop any new or worsening symptoms.     -Start antibiotic today  -Keep clean and pat dry  -apply ointment 2-3 times a day to clean skin  -Draining lesions should be kept covered.   Do not scratch your hands with your fingernails, they can become infected.  Advised no hydrogen peroxide, alcohol, or kitchen items on the rash.     Topical antibiotic ointment (like mupirocin) for mild cases  Oral antibiotics for more widespread infection  Keep the area clean and dry  Gently wash crusts with soap and water daily  Prevention Tips:  Avoid touching or scratching the sores  No work until 24-48 hours after starting antibiotics  Wash hands frequently  Dont share personal items  When to return for evaluation:  Spreading rash  Fever or worsening symptoms  No improvement in 2-3 days

## 2025-07-20 NOTE — PROGRESS NOTES
Subjective:      Patient ID: Aliya Alcaraz is a 35 y.o. female.    Vitals:  height is 5' (1.524 m) and weight is 73.2 kg (161 lb 6.4 oz). Her oral temperature is 98 °F (36.7 °C). Her blood pressure is 112/76 and her pulse is 89. Her respiration is 20 and oxygen saturation is 99%.     Chief Complaint: Rash (Pt co rash and itching ob bilateral arms and legs  x 1 week.  poss poison ivy)    Rash     As stated in chief complaint.  Patient reports worsening rash over 1 week that started to spread.  Patient reports possible exposure to poison ivy however has been using calamine lotion that has not started with a spread of this rash.      Skin:  Positive for rash and erythema.      Objective:     Physical Exam   Constitutional: She appears well-developed.  Non-toxic appearance. She does not appear ill. No distress.   HENT:   Head: Atraumatic.   Nose: No purulent discharge. Right sinus exhibits no maxillary sinus tenderness and no frontal sinus tenderness. Left sinus exhibits no maxillary sinus tenderness and no frontal sinus tenderness.   Mouth/Throat: Uvula is midline.   Eyes: Right eye exhibits no discharge. Left eye exhibits no discharge. Extraocular movement intact   Neck: Neck supple. No neck rigidity present.   Cardiovascular: Regular rhythm.   Pulmonary/Chest: Effort normal and breath sounds normal. No respiratory distress. She has no wheezes. She has no rhonchi. She has no rales.   Lymphadenopathy:     She has no cervical adenopathy.   Neurological: She is alert.   Skin: Skin is warm, dry, not diaphoretic and rash. erythema         Comments: Papular yellow crusted rash to various areas of body. Arms and leg, no TTP, no drainage    Psychiatric: Her behavior is normal. Mood, judgment and thought content normal.   Nursing note and vitals reviewed.      Assessment:     1. Impetigo        Plan:   ER precautions given and discussed  Patient encouraged to start antibiotics today take full course  Prescription management  with Keflex t.i.d. for 7 days and hydroxyzine for itching prescribed  Draining lesions should be kept covered.  Topical antibiotic ointment (like mupirocin) for mild cases  Oral antibiotics for more widespread infection  Keep the area clean and dry  Gently wash crusts with soap and water daily  Prevention Tips:  Avoid touching or scratching the sores  No work until 24-48 hours after starting antibiotics  Wash hands frequently  Dont share personal items  When to return for evaluation:  Spreading rash  Fever or worsening symptoms  No improvement in 2-3 days   Impetigo  -     cephALEXin (KEFLEX) 500 MG capsule; Take 1 capsule (500 mg total) by mouth every 6 (six) hours. for 7 days  Dispense: 28 capsule; Refill: 0  -     mupirocin (BACTROBAN) 2 % ointment; Apply topically 3 (three) times daily. for 7 days  Dispense: 15 g; Refill: 0  -     hydrOXYzine HCL (ATARAX) 25 MG tablet; Take 1 tablet (25 mg total) by mouth 3 (three) times daily as needed for Itching.  Dispense: 12 tablet; Refill: 0